# Patient Record
Sex: MALE | Race: WHITE | NOT HISPANIC OR LATINO | Employment: STUDENT | URBAN - METROPOLITAN AREA
[De-identification: names, ages, dates, MRNs, and addresses within clinical notes are randomized per-mention and may not be internally consistent; named-entity substitution may affect disease eponyms.]

---
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
## 2019-05-16 ENCOUNTER — TELEPHONE (OUTPATIENT)
Dept: PEDIATRICS | Facility: CLINIC | Age: 19
End: 2019-05-16

## 2019-05-16 NOTE — TELEPHONE ENCOUNTER
Patient dropped off school form in [place] location. Form faxed to 's office at [phone] to be signed. Had well visit 7/11/2018. Please fax back to [place] office at [phone] for patient to .     Call patient at [phone] when ready for .

## 2019-05-17 ENCOUNTER — TELEPHONE (OUTPATIENT)
Dept: PEDIATRICS | Facility: CLINIC | Age: 19
End: 2019-05-17

## 2019-05-17 NOTE — TELEPHONE ENCOUNTER
I was noted that ankur's school was never received at 's office. Form has been faxed to [phone]. Please fax back to [place] office at [phone] when signed.

## 2019-05-27 ENCOUNTER — TELEPHONE (OUTPATIENT)
Dept: PEDIATRICS | Facility: CLINIC | Age: 19
End: 2019-05-27

## 2019-05-27 NOTE — TELEPHONE ENCOUNTER
I am following up on a college form that was faxed to you guys on 5/17. Looks like you forward  the message on 5/17. Has  signed it yet?

## 2020-01-07 ENCOUNTER — OFFICE VISIT (OUTPATIENT)
Dept: FAMILY MEDICINE | Facility: CLINIC | Age: 20
End: 2020-01-07
Payer: COMMERCIAL

## 2020-01-07 VITALS
HEIGHT: 67 IN | OXYGEN SATURATION: 99 % | SYSTOLIC BLOOD PRESSURE: 118 MMHG | TEMPERATURE: 98 F | HEART RATE: 58 BPM | BODY MASS INDEX: 27.66 KG/M2 | DIASTOLIC BLOOD PRESSURE: 76 MMHG | WEIGHT: 176.25 LBS

## 2020-01-07 DIAGNOSIS — Z00.00 ANNUAL PHYSICAL EXAM: Primary | ICD-10-CM

## 2020-01-07 DIAGNOSIS — R22.2 LUMP OF SKIN OF BACK: ICD-10-CM

## 2020-01-07 DIAGNOSIS — L70.0 ACNE VULGARIS: ICD-10-CM

## 2020-01-07 DIAGNOSIS — Z23 NEED FOR IMMUNIZATION AGAINST INFLUENZA: ICD-10-CM

## 2020-01-07 PROCEDURE — 99999 PR PBB SHADOW E&M-EST. PATIENT-LVL III: ICD-10-PCS | Mod: PBBFAC,,, | Performed by: INTERNAL MEDICINE

## 2020-01-07 PROCEDURE — 90471 IMMUNIZATION ADMIN: CPT | Mod: S$GLB,,, | Performed by: INTERNAL MEDICINE

## 2020-01-07 PROCEDURE — 90686 IIV4 VACC NO PRSV 0.5 ML IM: CPT | Mod: S$GLB,,, | Performed by: INTERNAL MEDICINE

## 2020-01-07 PROCEDURE — 99395 PREV VISIT EST AGE 18-39: CPT | Mod: 25,S$GLB,, | Performed by: INTERNAL MEDICINE

## 2020-01-07 PROCEDURE — 99999 PR PBB SHADOW E&M-EST. PATIENT-LVL III: CPT | Mod: PBBFAC,,, | Performed by: INTERNAL MEDICINE

## 2020-01-07 PROCEDURE — 90686 FLU VACCINE (QUAD) GREATER THAN OR EQUAL TO 3YO PRESERVATIVE FREE IM: ICD-10-PCS | Mod: S$GLB,,, | Performed by: INTERNAL MEDICINE

## 2020-01-07 PROCEDURE — 90471 FLU VACCINE (QUAD) GREATER THAN OR EQUAL TO 3YO PRESERVATIVE FREE IM: ICD-10-PCS | Mod: S$GLB,,, | Performed by: INTERNAL MEDICINE

## 2020-01-07 PROCEDURE — 99395 PR PREVENTIVE VISIT,EST,18-39: ICD-10-PCS | Mod: 25,S$GLB,, | Performed by: INTERNAL MEDICINE

## 2020-01-07 RX ORDER — CLINDAMYCIN PHOSPHATE 10 MG/ML
1 SOLUTION TOPICAL DAILY
COMMUNITY
End: 2020-09-02

## 2020-01-07 RX ORDER — TAZAROTENE 1 MG/G
0.1 CREAM TOPICAL NIGHTLY
COMMUNITY
End: 2020-09-10

## 2020-01-07 RX ORDER — MINOCYCLINE HYDROCHLORIDE 135 MG/1
135 CAPSULE, EXTENDED RELEASE ORAL DAILY
COMMUNITY
End: 2020-09-02

## 2020-01-07 NOTE — ASSESSMENT & PLAN NOTE
Eats healthy, uses calorie tracker, cooks a lot.  Exercises 6 times per week, does 15 mins -30 mins of cardio and then does weight lifting.  Nonsmoker, nondrinker, comes to appt alone.  Sexually active, uses protection.

## 2020-01-07 NOTE — PROGRESS NOTES
[facility] Note    Chief Complaint      Chief Complaint   Patient presents with    [facility]    Annual Exam     History of Present Illness      Benoit Vasquez is a 19 y.o. male who presents today for annual preventative exam.  Patient comes to appointment alone.    Derm- Dr. 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  Originally from [place], graduated from [employer].  Problem List Items Addressed This Visit     Annual physical exam - Primary    Current Assessment & Plan     Eats healthy, uses calorie tracker, cooks a lot.  Exercises 6 times per week, does 15 mins -30 mins of cardio and then does weight lifting.  Nonsmoker, nondrinker, comes to appt alone.  Sexually active, uses protection.         Acne vulgaris    Current Assessment & Plan     Sees Dr. Tovar, doing better on various ointments for last 6 months.         Lump of skin of back    Current Assessment & Plan     Noticed on back of neck last week.           Other Visit Diagnoses     Need for immunization against influenza        Relevant Orders    Influenza - Quadrivalent (PF)          Health Maintenance   Topic Date Due    TETANUS VACCINE  06/01/2021    Lipid Panel  Completed    HPV Vaccines  Completed       Past Medical History:   Diagnosis Date    History of bronchitis        Past Surgical History:   Procedure Laterality Date    CIRCUMCISION      NO PAST SURGERIES         family history is not on file.     Social History     Tobacco Use    Smoking status: Never Smoker    Smokeless tobacco: Never Used   Substance Use Topics    Alcohol use: Yes     Alcohol/week: 1.0 standard drinks     Types: 1 Cans of beer per week    Drug use: Yes     Types: Marijuana       Review of Systems   Constitutional: Negative for chills and fever.   HENT: Negative for congestion and sore throat.    Eyes: Negative for blurred vision and discharge.   Respiratory: Negative for cough and shortness of breath.   "  Cardiovascular: Negative for chest pain and palpitations.   Gastrointestinal: Negative for constipation, diarrhea, nausea and vomiting.   Genitourinary: Negative for dysuria and hematuria.   Musculoskeletal: Negative for falls and myalgias.   Skin: Negative for itching and rash.   Neurological: Negative for dizziness and headaches.        Outpatient Encounter Medications as of 1/7/2020   Medication Sig Dispense Refill    clindamycin (CLEOCIN T) 1 % Swab Apply 1 each topically once daily.      minocycline (XIMINO) 135 mg Cp24 Take 135 mg by mouth once daily.      tazarotene (AVAGE) 0.1 % cream Apply 0.1 % topically every evening.      [DISCONTINUED] sildenafil (REVATIO) 20 mg Tab Take 1 tablet (20 mg total) by mouth As instructed. Take 2-5 tablets as needed. 100 tablet 11     No facility-administered encounter medications on file as of 1/7/2020.        Review of patient's allergies indicates:  No Known Allergies    Physical Exam      Vital Signs  Temp: 98 °F (36.7 °C)  Temp src: Oral  Pulse: (!) 58  SpO2: 99 %  BP: 118/76  BP Location: Left arm  Patient Position: Sitting  Pain Score: 0-No pain  Height and Weight  Height: 5' 7" (170.2 cm)  Weight: 79.9 kg (176 lb 4.1 oz)  BSA (Calculated - sq m): 1.94 sq meters  BMI (Calculated): 27.6  Weight in (lb) to have BMI = 25: 159.3]    Physical Exam   Constitutional: He is oriented to person, place, and time. He appears well-developed and well-nourished.   HENT:   Head: Normocephalic and atraumatic.   Right Ear: External ear normal.   Left Ear: External ear normal.   Eyes: Right eye exhibits no discharge. Left eye exhibits no discharge.   Neck: Normal range of motion. No thyromegaly present.   Cardiovascular: Normal rate, regular rhythm and intact distal pulses.   No murmur heard.  Pulmonary/Chest: Effort normal and breath sounds normal. No respiratory distress.   Abdominal: Soft. Bowel sounds are normal. He exhibits no distension. There is no tenderness. "   Musculoskeletal: Normal range of motion. He exhibits no deformity.   Neurological: He is alert and oriented to person, place, and time.   Skin: Skin is warm and dry. No rash noted.   Psychiatric: He has a normal mood and affect. His behavior is normal.        Laboratory:  CBC:  No results for input(s): WBC, RBC, HGB, HCT, PLT, MCV, MCH, MCHC in the last 2160 hours.  CMP:  No results for input(s): GLU, CALCIUM, ALBUMIN, PROT, NA, K, CO2, CL, BUN, ALKPHOS, ALT, AST, BILITOT in the last 2160 hours.    Invalid input(s): CREATININ  URINALYSIS:  No results for input(s): COLORU, CLARITYU, SPECGRAV, PHUR, PROTEINUA, GLUCOSEU, BILIRUBINCON, BLOODU, WBCU, RBCU, BACTERIA, MUCUS, NITRITE, LEUKOCYTESUR, UROBILINOGEN, HYALINECASTS in the last 2160 hours.   LIPIDS:  No results for input(s): TSH, HDL, CHOL, TRIG, LDLCALC, CHOLHDL, NONHDLCHOL, TOTALCHOLEST in the last 2160 hours.  TSH:  No results for input(s): TSH in the last 2160 hours.  A1C:  No results for input(s): HGBA1C in the last 2160 hours.    Radiology:  No new imaging    Assessment/Plan     Benoit Vasquez is a 19 y.o.male with:    1. Annual physical exam    2. Need for immunization against influenza  - Influenza - Quadrivalent (PF)    3. Acne vulgaris    4. Lump of skin of back    -Lipoma vs sebaceous cyst on back of neck, will f/u with Dr. Tovar  -flu shot today  -plan for full panel of labs next visit  -Continue current medications and maintain follow up with specialists.  Return to clinic in 1 year     Lissa Ocampo MD  [facility]

## 2020-04-13 PROBLEM — Z00.00 ANNUAL PHYSICAL EXAM: Status: RESOLVED | Noted: 2020-01-07 | Resolved: 2020-04-13

## 2020-09-02 ENCOUNTER — OFFICE VISIT (OUTPATIENT)
Dept: FAMILY MEDICINE | Facility: CLINIC | Age: 20
End: 2020-09-02
Payer: COMMERCIAL

## 2020-09-02 VITALS
BODY MASS INDEX: 24.72 KG/M2 | SYSTOLIC BLOOD PRESSURE: 122 MMHG | HEART RATE: 56 BPM | DIASTOLIC BLOOD PRESSURE: 70 MMHG | WEIGHT: 162.56 LBS | OXYGEN SATURATION: 98 % | TEMPERATURE: 98 F

## 2020-09-02 DIAGNOSIS — Z11.59 SCREENING FOR VIRAL DISEASE: ICD-10-CM

## 2020-09-02 DIAGNOSIS — M25.561 CHRONIC PAIN OF BOTH KNEES: ICD-10-CM

## 2020-09-02 DIAGNOSIS — Z11.4 SCREENING FOR HIV (HUMAN IMMUNODEFICIENCY VIRUS): ICD-10-CM

## 2020-09-02 DIAGNOSIS — M25.562 CHRONIC PAIN OF BOTH KNEES: ICD-10-CM

## 2020-09-02 DIAGNOSIS — G47.9 SLEEP DISORDER: ICD-10-CM

## 2020-09-02 DIAGNOSIS — M25.562 CHRONIC PAIN OF LEFT KNEE: ICD-10-CM

## 2020-09-02 DIAGNOSIS — G89.29 CHRONIC PAIN OF LEFT KNEE: ICD-10-CM

## 2020-09-02 DIAGNOSIS — G89.29 CHRONIC PAIN OF BOTH KNEES: ICD-10-CM

## 2020-09-02 DIAGNOSIS — Z00.00 ANNUAL PHYSICAL EXAM: Primary | ICD-10-CM

## 2020-09-02 PROCEDURE — 99999 PR PBB SHADOW E&M-EST. PATIENT-LVL III: ICD-10-PCS | Mod: PBBFAC,,, | Performed by: INTERNAL MEDICINE

## 2020-09-02 PROCEDURE — 99214 PR OFFICE/OUTPT VISIT, EST, LEVL IV, 30-39 MIN: ICD-10-PCS | Mod: S$GLB,,, | Performed by: INTERNAL MEDICINE

## 2020-09-02 PROCEDURE — 3008F PR BODY MASS INDEX (BMI) DOCUMENTED: ICD-10-PCS | Mod: CPTII,S$GLB,, | Performed by: INTERNAL MEDICINE

## 2020-09-02 PROCEDURE — 99214 OFFICE O/P EST MOD 30 MIN: CPT | Mod: S$GLB,,, | Performed by: INTERNAL MEDICINE

## 2020-09-02 PROCEDURE — 99999 PR PBB SHADOW E&M-EST. PATIENT-LVL III: CPT | Mod: PBBFAC,,, | Performed by: INTERNAL MEDICINE

## 2020-09-02 PROCEDURE — 3008F BODY MASS INDEX DOCD: CPT | Mod: CPTII,S$GLB,, | Performed by: INTERNAL MEDICINE

## 2020-09-09 ENCOUNTER — PATIENT OUTREACH (OUTPATIENT)
Dept: ADMINISTRATIVE | Facility: OTHER | Age: 20
End: 2020-09-09

## 2020-09-10 ENCOUNTER — TELEPHONE (OUTPATIENT)
Dept: SLEEP MEDICINE | Facility: CLINIC | Age: 20
End: 2020-09-10

## 2020-09-10 ENCOUNTER — OFFICE VISIT (OUTPATIENT)
Dept: SLEEP MEDICINE | Facility: CLINIC | Age: 20
End: 2020-09-10
Payer: COMMERCIAL

## 2020-09-10 DIAGNOSIS — G47.9 SLEEP DISORDER: ICD-10-CM

## 2020-09-10 DIAGNOSIS — G25.81 RLS (RESTLESS LEGS SYNDROME): ICD-10-CM

## 2020-09-10 DIAGNOSIS — E83.10 DISORDER OF IRON METABOLISM, UNSPECIFIED: Primary | ICD-10-CM

## 2020-09-10 DIAGNOSIS — G47.10 HYPERSOMNIA, UNSPECIFIED: Primary | ICD-10-CM

## 2020-09-10 DIAGNOSIS — G47.10 HYPERSOMNIA, UNSPECIFIED: ICD-10-CM

## 2020-09-10 PROCEDURE — 99203 OFFICE O/P NEW LOW 30 MIN: CPT | Mod: 95,,, | Performed by: NURSE PRACTITIONER

## 2020-09-10 PROCEDURE — 99203 PR OFFICE/OUTPT VISIT, NEW, LEVL III, 30-44 MIN: ICD-10-PCS | Mod: 95,,, | Performed by: NURSE PRACTITIONER

## 2020-09-10 NOTE — PROGRESS NOTES
"The patient location is: home  The chief complaint leading to consultation is sleep evaluation  Visit type: TELE AUDIOVISUAL:66050    Face to Face time with patient: 35 minutes of total time spent on the encounter, which includes face to face time and non-face to face time preparing to see the patient (eg, review of tests), Obtaining and/or reviewing separately obtained history, Documenting clinical information in the electronic or other health record, Independently interpreting results (not separately reported) and communicating results to the patient/family/caregiver, or Care coordination (not separately reported).   Each patient to whom he or she provides medical services by telemedicine is:  (1) informed of the relationship between the physician and patient and the respective role of any other health care provider with respect to management of the patient; and (2) notified that he or she may decline to receive medical services by telemedicine and may withdraw from such care at any time.    Notes: Benoit Vasquez  was seen as a new patient, referred by Dr. Ocampo for the evaluation of sleep disturbance    CHIEF COMPLAINT: disrupted sleep    HISTORY OF PRESENT ILLNESS:He has never had a sleep study. Wakes up middle of the night unable to breathe, ongoing several years. Does not occur every night. Little more fatigued due to disruptions. Not sure if snores. Denies witnessed apneic pauses. Slept undisturbed last night. AM headaches less since reducing caffeine intake (was drinking 3-4 large coffee/d, now about 1)    +sleep paralysis, quite a few times  Shadows in room sees of people upon waking up and feels like he's still in dream at times/lucid  Vivid dreams "for sure lucid dream every night" quickly after sleep onset  Talk in sleep right after falling asleep  Denies excessive sleepiness throughout day  +poor focus/concentration  +fragmented sleep  +muscle weakness in legs when laughing really hard/happened few " times  While playing beach volleyball had uncontrollable urge to doze off, had to go nap    Takes MLT helps stay asleep    +symptoms of restless legs, has to rotate legs/move them when in bed/kicks them for awhile. Happens few times a week    EPWORTH SLEEPINESS SCALE 9/10/2020   Sitting and reading 1   Watching TV 0   Sitting, inactive in a public place (e.g. a theatre or a meeting) 0   As a passenger in a car for an hour without a break 2   Lying down to rest in the afternoon when circumstances permit 1   Sitting and talking to someone 0   Sitting quietly after a lunch without alcohol 0   In a car, while stopped for a few minutes in traffic 0   Total score 4     Sleep Clinic New Patient 9/10/2020   What time do you go to bed on a week day? (Give a range) 10 pm   What time do you go to bed on a day off? (Give a range) 12 AM   How long does it take you to fall asleep? (Give a range) Very long, anywhere from 45 minutes to a couple hours   On average, how many times per night do you wake up? 2-3 times   How long does it take you to fall back into sleep? (Give a range) Twenty minutes to half an hour   What time do you wake up to start your day on a week day? (Give a range) 6 AM to 8 AM   What time do you wake up to start your day on a day off? (Give a range) 9 AM to 11 AM   What time do you get out of bed? (Give a range) 6:15 AM to 8:45 AM   On average, how many hours do you sleep? 6-8   On average, how many naps do you take per day? 0   Rate your sleep quality from 0 to 5 (0-poor, 5-great). 2   Have you experienced:  Weight loss?   How much weight have you lost or gained (in lbs.) in the last year? 15 lbs   On average, how many times per night do you go to the bathroom?  Once or twice   Have you ever had a sleep study/CPAP machine/surgery for sleep apnea? No   Have you ever had a CPAP machine for sleep apnea? No   Have you ever had surgery for sleep apnea? No     FAMILY HISTORY: ? 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  Yes   Sore throat or dry mouth in the morning? Yes   Irregular or very fast heart beat?  No   Shortness of breath?  No   Acid reflux? Yes--with acidic christina/foods   Body aches and pains?  Yes--knee pain/seeing somebody   Morning headaches? Yes   Dizziness? Sometimes   Mood changes?  Yes   Do you exercise?  Yes   Do you feel like moving your legs a lot?  Yes   Otherwise, a balance of 10 systems reviewed is negative    PHYSICAL EXAM:   There were no vitals taken for this visit.  GENERAL: W/D, W/N body habitus, well groomed   HEENT: Conjunctivae are non-erythematous; Pupils equal, round, and reactive to light; Nose is symmetrical  SKIN: On face and neck: No abrasions, no rashes, no lesions  RESPIRATORY: Normal chest expansion and non-labored breathing at rest.    NEURO/PSYCH: Oriented to time, place and person. Normal attention span and concentration. Affect is full. Mood is normal.       ASSESSMENT:   Hypersomnia, unspecified with fragmented sleep, sleep paralysis, vivid dreaming with sleep onset, uncontrollable urges to nap, hypnagogic and hypnopompic hallucinations, cataplexy like episodes, suspicious for central hypersomnolence.   RLS  Iron metabolism disorder, unspecified  Nasal restriction    PLAN:   1. Polysomnogram followed by MSLT (discussed plan of care urine tox, 2 wk sleep log)  2. Check ferritin. Discussed etiology and LILLY as treatable cause of RLS  3. Discussed etiology of CLIVE and potential ramifications of untreated CLIVE, including stroke, heart disease, HTN.  We discussed potential treatment options, which could include weight loss (10-15%),continuous positive airway pressure (CPAP-definitive), mandibular advancement splint by dentist, or referral for surgical consideration.   4. The patient was advised to abstain from driving should he feel sleepy or drowsy.   5.  Consider seeing ENT to improve nasal patency        Thank you for allowing me the opportunity to participate in the care of your patient

## 2020-09-10 NOTE — LETTER
September 10, 2020      Lissa Ocampo MD  [address]  [address]  [place] [address]           [facility]  [address] [place] [address]  Phone: [phone]  Fax: [phone]          Patient: Benoit Vasquez   MR Number: [id number]   YOB: 2000   Date of Visit: 9/10/2020       Dear Dr. Lissa Ocampo:    Thank you for referring Benoit Vasquez to me for evaluation. Attached you will find relevant portions of my assessment and plan of care.    If you have questions, please do not hesitate to call me. I look forward to following Benoit Vasquez along with you.    Sincerely,    Bryanna Morales, NP    Enclosure  CC:  No Recipients    If you would like to receive this communication electronically, please contact [email or web address] or [phone] to request more information on [employer] access.    For providers and/or their staff who would like to refer a patient to [facility], please contact us through our one-stop-shop provider referral line, [facility], at [phone].    If you feel you have received this communication in error or would no longer like to receive these types of communications, please e-mail [email or web address]

## 2020-12-22 ENCOUNTER — PATIENT MESSAGE (OUTPATIENT)
Dept: FAMILY MEDICINE | Facility: CLINIC | Age: 20
End: 2020-12-22

## 2020-12-22 DIAGNOSIS — M25.561 CHRONIC PAIN OF BOTH KNEES: Primary | ICD-10-CM

## 2020-12-22 DIAGNOSIS — G89.29 CHRONIC PAIN OF BOTH KNEES: Primary | ICD-10-CM

## 2020-12-22 DIAGNOSIS — M25.562 CHRONIC PAIN OF BOTH KNEES: Primary | ICD-10-CM

## 2021-01-06 ENCOUNTER — CLINICAL SUPPORT (OUTPATIENT)
Dept: REHABILITATION | Facility: HOSPITAL | Age: 21
End: 2021-01-06
Payer: COMMERCIAL

## 2021-01-06 DIAGNOSIS — Z74.09 IMPAIRED MOBILITY: ICD-10-CM

## 2021-01-06 DIAGNOSIS — M25.561 CHRONIC PAIN OF BOTH KNEES: Primary | ICD-10-CM

## 2021-01-06 DIAGNOSIS — R53.1 DECREASED STRENGTH: ICD-10-CM

## 2021-01-06 DIAGNOSIS — M25.562 CHRONIC PAIN OF BOTH KNEES: Primary | ICD-10-CM

## 2021-01-06 DIAGNOSIS — G89.29 CHRONIC PAIN OF BOTH KNEES: Primary | ICD-10-CM

## 2021-01-06 PROCEDURE — 97110 THERAPEUTIC EXERCISES: CPT | Mod: PN

## 2021-01-06 PROCEDURE — 97161 PT EVAL LOW COMPLEX 20 MIN: CPT | Mod: PN

## 2021-01-11 PROBLEM — R53.1 DECREASED STRENGTH: Status: ACTIVE | Noted: 2021-01-11

## 2021-01-11 PROBLEM — Z74.09 IMPAIRED MOBILITY: Status: ACTIVE | Noted: 2021-01-11

## 2021-01-11 PROBLEM — G89.29 CHRONIC PAIN OF BOTH KNEES: Status: ACTIVE | Noted: 2021-01-11

## 2021-01-11 PROBLEM — M25.562 CHRONIC PAIN OF BOTH KNEES: Status: ACTIVE | Noted: 2021-01-11

## 2021-01-11 PROBLEM — M25.561 CHRONIC PAIN OF BOTH KNEES: Status: ACTIVE | Noted: 2021-01-11

## 2021-02-15 ENCOUNTER — DOCUMENTATION ONLY (OUTPATIENT)
Dept: REHABILITATION | Facility: HOSPITAL | Age: 21
End: 2021-02-15

## 2021-02-15 ENCOUNTER — TELEPHONE (OUTPATIENT)
Dept: REHABILITATION | Facility: HOSPITAL | Age: 21
End: 2021-02-15

## 2021-02-25 ENCOUNTER — DOCUMENTATION ONLY (OUTPATIENT)
Dept: REHABILITATION | Facility: HOSPITAL | Age: 21
End: 2021-02-25

## 2021-02-25 PROBLEM — Z74.09 IMPAIRED MOBILITY: Status: RESOLVED | Noted: 2021-01-11 | Resolved: 2021-02-25

## 2021-02-25 PROBLEM — M25.562 CHRONIC PAIN OF BOTH KNEES: Status: RESOLVED | Noted: 2021-01-11 | Resolved: 2021-02-25

## 2021-02-25 PROBLEM — R53.1 DECREASED STRENGTH: Status: RESOLVED | Noted: 2021-01-11 | Resolved: 2021-02-25

## 2021-02-25 PROBLEM — M25.561 CHRONIC PAIN OF BOTH KNEES: Status: RESOLVED | Noted: 2021-01-11 | Resolved: 2021-02-25

## 2021-02-25 PROBLEM — G89.29 CHRONIC PAIN OF BOTH KNEES: Status: RESOLVED | Noted: 2021-01-11 | Resolved: 2021-02-25

## 2021-04-13 ENCOUNTER — IMMUNIZATION (OUTPATIENT)
Dept: PRIMARY CARE CLINIC | Facility: CLINIC | Age: 21
End: 2021-04-13
Payer: COMMERCIAL

## 2021-04-13 DIAGNOSIS — Z23 NEED FOR VACCINATION: Primary | ICD-10-CM

## 2021-04-13 PROCEDURE — 0001A COVID-19, MRNA, LNP-S, PF, 30 MCG/0.3 ML DOSE VACCINE: CPT | Mod: CV19,S$GLB,, | Performed by: INTERNAL MEDICINE

## 2021-04-13 PROCEDURE — 91300 COVID-19, MRNA, LNP-S, PF, 30 MCG/0.3 ML DOSE VACCINE: ICD-10-PCS | Mod: S$GLB,,, | Performed by: INTERNAL MEDICINE

## 2021-04-13 PROCEDURE — 91300 COVID-19, MRNA, LNP-S, PF, 30 MCG/0.3 ML DOSE VACCINE: CPT | Mod: S$GLB,,, | Performed by: INTERNAL MEDICINE

## 2021-04-13 PROCEDURE — 0001A COVID-19, MRNA, LNP-S, PF, 30 MCG/0.3 ML DOSE VACCINE: ICD-10-PCS | Mod: CV19,S$GLB,, | Performed by: INTERNAL MEDICINE

## 2021-05-04 ENCOUNTER — IMMUNIZATION (OUTPATIENT)
Dept: PRIMARY CARE CLINIC | Facility: CLINIC | Age: 21
End: 2021-05-04
Payer: COMMERCIAL

## 2021-05-04 DIAGNOSIS — Z23 NEED FOR VACCINATION: Primary | ICD-10-CM

## 2021-05-04 PROCEDURE — 91300 COVID-19, MRNA, LNP-S, PF, 30 MCG/0.3 ML DOSE VACCINE: ICD-10-PCS | Mod: S$GLB,,, | Performed by: INTERNAL MEDICINE

## 2021-05-04 PROCEDURE — 0002A COVID-19, MRNA, LNP-S, PF, 30 MCG/0.3 ML DOSE VACCINE: ICD-10-PCS | Mod: CV19,S$GLB,, | Performed by: INTERNAL MEDICINE

## 2021-05-04 PROCEDURE — 0002A COVID-19, MRNA, LNP-S, PF, 30 MCG/0.3 ML DOSE VACCINE: CPT | Mod: CV19,S$GLB,, | Performed by: INTERNAL MEDICINE

## 2021-05-04 PROCEDURE — 91300 COVID-19, MRNA, LNP-S, PF, 30 MCG/0.3 ML DOSE VACCINE: CPT | Mod: S$GLB,,, | Performed by: INTERNAL MEDICINE

## 2021-07-13 ENCOUNTER — PATIENT MESSAGE (OUTPATIENT)
Dept: FAMILY MEDICINE | Facility: CLINIC | Age: 21
End: 2021-07-13

## 2021-08-06 ENCOUNTER — PATIENT MESSAGE (OUTPATIENT)
Dept: FAMILY MEDICINE | Facility: CLINIC | Age: 21
End: 2021-08-06

## 2021-08-16 ENCOUNTER — PATIENT MESSAGE (OUTPATIENT)
Dept: FAMILY MEDICINE | Facility: CLINIC | Age: 21
End: 2021-08-16

## 2021-08-16 ENCOUNTER — TELEPHONE (OUTPATIENT)
Dept: FAMILY MEDICINE | Facility: CLINIC | Age: 21
End: 2021-08-16

## 2021-08-23 ENCOUNTER — CLINICAL SUPPORT (OUTPATIENT)
Dept: FAMILY MEDICINE | Facility: CLINIC | Age: 21
End: 2021-08-23
Payer: COMMERCIAL

## 2021-08-23 DIAGNOSIS — Z23 NEED FOR TDAP VACCINATION: Primary | ICD-10-CM

## 2021-08-23 PROCEDURE — 90715 TDAP VACCINE 7 YRS/> IM: CPT | Mod: S$GLB,,, | Performed by: INTERNAL MEDICINE

## 2021-08-23 PROCEDURE — 90471 IMMUNIZATION ADMIN: CPT | Mod: S$GLB,,, | Performed by: INTERNAL MEDICINE

## 2021-08-23 PROCEDURE — 90471 TDAP VACCINE GREATER THAN OR EQUAL TO 7YO IM: ICD-10-PCS | Mod: S$GLB,,, | Performed by: INTERNAL MEDICINE

## 2021-08-23 PROCEDURE — 90715 TDAP VACCINE GREATER THAN OR EQUAL TO 7YO IM: ICD-10-PCS | Mod: S$GLB,,, | Performed by: INTERNAL MEDICINE

## 2021-10-05 ENCOUNTER — PATIENT MESSAGE (OUTPATIENT)
Dept: ADMINISTRATIVE | Facility: HOSPITAL | Age: 21
End: 2021-10-05

## 2021-10-19 ENCOUNTER — PATIENT MESSAGE (OUTPATIENT)
Dept: FAMILY MEDICINE | Facility: CLINIC | Age: 21
End: 2021-10-19

## 2021-10-28 ENCOUNTER — CLINICAL SUPPORT (OUTPATIENT)
Dept: URGENT CARE | Facility: CLINIC | Age: 21
End: 2021-10-28
Payer: COMMERCIAL

## 2021-10-28 DIAGNOSIS — Z20.822 ENCOUNTER FOR LABORATORY TESTING FOR COVID-19 VIRUS: Primary | ICD-10-CM

## 2021-10-28 LAB
CTP QC/QA: YES
SARS-COV-2 RDRP RESP QL NAA+PROBE: NEGATIVE

## 2021-10-28 PROCEDURE — U0002 COVID-19 LAB TEST NON-CDC: HCPCS | Mod: QW,S$GLB,, | Performed by: PHYSICIAN ASSISTANT

## 2021-10-28 PROCEDURE — U0002: ICD-10-PCS | Mod: QW,S$GLB,, | Performed by: PHYSICIAN ASSISTANT

## 2021-10-31 ENCOUNTER — PATIENT MESSAGE (OUTPATIENT)
Dept: FAMILY MEDICINE | Facility: CLINIC | Age: 21
End: 2021-10-31
Payer: COMMERCIAL

## 2021-11-01 ENCOUNTER — PATIENT MESSAGE (OUTPATIENT)
Dept: FAMILY MEDICINE | Facility: CLINIC | Age: 21
End: 2021-11-01
Payer: COMMERCIAL

## 2021-12-23 ENCOUNTER — PATIENT MESSAGE (OUTPATIENT)
Dept: FAMILY MEDICINE | Facility: CLINIC | Age: 21
End: 2021-12-23
Payer: COMMERCIAL

## 2022-01-10 ENCOUNTER — PATIENT MESSAGE (OUTPATIENT)
Dept: ADMINISTRATIVE | Facility: HOSPITAL | Age: 22
End: 2022-01-10
Payer: COMMERCIAL

## 2022-02-07 ENCOUNTER — OFFICE VISIT (OUTPATIENT)
Dept: DERMATOLOGY | Facility: CLINIC | Age: 22
End: 2022-02-07
Payer: COMMERCIAL

## 2022-02-07 DIAGNOSIS — Z79.899 HIGH RISK MEDICATION USE: ICD-10-CM

## 2022-02-07 DIAGNOSIS — L70.0 ACNE VULGARIS: Primary | ICD-10-CM

## 2022-02-07 PROCEDURE — 1160F RVW MEDS BY RX/DR IN RCRD: CPT | Mod: CPTII,95,, | Performed by: DERMATOLOGY

## 2022-02-07 PROCEDURE — 1159F PR MEDICATION LIST DOCUMENTED IN MEDICAL RECORD: ICD-10-PCS | Mod: CPTII,95,, | Performed by: DERMATOLOGY

## 2022-02-07 PROCEDURE — 99203 OFFICE O/P NEW LOW 30 MIN: CPT | Mod: 95,,, | Performed by: DERMATOLOGY

## 2022-02-07 PROCEDURE — 1159F MED LIST DOCD IN RCRD: CPT | Mod: CPTII,95,, | Performed by: DERMATOLOGY

## 2022-02-07 PROCEDURE — 1160F PR REVIEW ALL MEDS BY PRESCRIBER/CLIN PHARMACIST DOCUMENTED: ICD-10-PCS | Mod: CPTII,95,, | Performed by: DERMATOLOGY

## 2022-02-07 PROCEDURE — 99203 PR OFFICE/OUTPT VISIT, NEW, LEVL III, 30-44 MIN: ICD-10-PCS | Mod: 95,,, | Performed by: DERMATOLOGY

## 2022-02-07 NOTE — PROGRESS NOTES
The patient location is: [facility]  The chief complaint leading to consultation is: acne    Visit type: audiovisual    Face to Face time with patient: 6m    10 minutes of total time spent on the encounter, which includes face to face time and non-face to face time preparing to see the patient (eg, review of tests), Obtaining and/or reviewing separately obtained history, Documenting clinical information in the electronic or other health record, Independently interpreting results (not separately reported) and communicating results to the patient/family/caregiver, or Care coordination (not separately reported).         Each patient to whom he or she provides medical services by telemedicine is:  (1) informed of the relationship between the physician and patient and the respective role of any other health care provider with respect to management of the patient; and (2) notified that he or she may decline to receive medical services by telemedicine and may withdraw from such care at any time.    Notes:     Subjective:       Patient ID:  Benoit Vasquez is a 21 y.o. male who presents for No chief complaint on file.    Pt with acne of the face.  No tx now.  Was on accutane 6 yrs ago with good results.  Wants to go back on it.      Review of Systems   Constitutional: Negative for fever.   HENT: Negative for sore throat.    Respiratory: Negative for cough.         Objective:    Physical Exam   Constitutional: He appears well-developed and well-nourished.   Eyes: No conjunctival no injection.   Neurological: He is alert and oriented to person, place, and time.   Psychiatric: He has a normal mood and affect.   Skin:                 Diagram Legend     Erythematous scaling macule/papule c/w actinic keratosis       Vascular papule c/w angioma      Pigmented verrucoid papule/plaque c/w seborrheic keratosis      Yellow umbilicated papule c/w sebaceous hyperplasia      Irregularly shaped tan macule c/w lentigo     1-2 mm smooth white  papules consistent with Milia      Movable subcutaneous cyst with punctum c/w epidermal inclusion cyst      Subcutaneous movable cyst c/w pilar cyst      Firm pink to brown papule c/w dermatofibroma      Pedunculated fleshy papule(s) c/w skin tag(s)      Evenly pigmented macule c/w junctional nevus     Mildly variegated pigmented, slightly irregular-bordered macule c/w mildly atypical nevus      Flesh colored to evenly pigmented papule c/w intradermal nevus       Pink pearly papule/plaque c/w basal cell carcinoma      Erythematous hyperkeratotic cursted plaque c/w SCC      Surgical scar with no sign of skin cancer recurrence      Open and closed comedones      Inflammatory papules and pustules      Verrucoid papule consistent consistent with wart     Erythematous eczematous patches and plaques     Dystrophic onycholytic nail with subungual debris c/w onychomycosis     Umbilicated papule    Erythematous-base heme-crusted tan verrucoid plaque consistent with inflamed seborrheic keratosis     Erythematous Silvery Scaling Plaque c/w Psoriasis     See annotation              Assessment / Plan:        Acne vulgaris  Reviewed with patient today acne treatment options of topicals, oral antibiotics, aldactone (if applicable), accutane, cosmetic PDT.  Pt wants accutane.  Discussed risks and benefits of Isotretinoin including but not limited to dry eyes, dry skin, and dry lips; headaches; nosebleeds; muscle aches; joint aches; elevated liver functions; elevated cholesterol or triglycerides; depression; diarrhea/stomach cramping (inflammatory bowel disease); increased sun sensitivity. No laser while on Isotretinoin or for one month after completion of Isotretinoin course. No waxing and no donating blood while on Isotretinoin or for one month after completion of Isotretinoin course. No sharing the Isotretinoin with other people.     Will get consents signed and enter patient into [employer] program.  Will check baseline lipid  panel, liver function tests.   If labs normal, will start Isotretinoin at 40 mg po daily.   Brochures reviewed and provided.  To be done at in person appt soon.  No etoh rev'd.      High risk medication use  -     Lipid Panel; Future; Expected date: 02/07/2022  -     Hepatic Function Panel; Future; Expected date: 02/07/2022  Discussed with patient the need for laboratory work up for further evaluation.  Discussed that such investigation may not be helpful.             Follow up in about 1 week (around 2/14/2022).

## 2022-02-22 ENCOUNTER — LAB VISIT (OUTPATIENT)
Dept: LAB | Facility: HOSPITAL | Age: 22
End: 2022-02-22
Attending: DERMATOLOGY
Payer: COMMERCIAL

## 2022-02-22 DIAGNOSIS — Z79.899 HIGH RISK MEDICATION USE: ICD-10-CM

## 2022-02-22 LAB
ALBUMIN SERPL BCP-MCNC: 4.5 G/DL (ref 3.5–5.2)
ALP SERPL-CCNC: 64 U/L (ref 55–135)
ALT SERPL W/O P-5'-P-CCNC: 34 U/L (ref 10–44)
AST SERPL-CCNC: 29 U/L (ref 10–40)
BILIRUB DIRECT SERPL-MCNC: 0.2 MG/DL (ref 0.1–0.3)
BILIRUB SERPL-MCNC: 0.5 MG/DL (ref 0.1–1)
CHOLEST SERPL-MCNC: 160 MG/DL (ref 120–199)
CHOLEST/HDLC SERPL: 3.7 {RATIO} (ref 2–5)
HDLC SERPL-MCNC: 43 MG/DL (ref 40–75)
HDLC SERPL: 26.9 % (ref 20–50)
LDLC SERPL CALC-MCNC: 103.2 MG/DL (ref 63–159)
NONHDLC SERPL-MCNC: 117 MG/DL
PROT SERPL-MCNC: 6.9 G/DL (ref 6–8.4)
TRIGL SERPL-MCNC: 69 MG/DL (ref 30–150)

## 2022-02-22 PROCEDURE — 80061 LIPID PANEL: CPT | Performed by: DERMATOLOGY

## 2022-02-22 PROCEDURE — 80076 HEPATIC FUNCTION PANEL: CPT | Performed by: DERMATOLOGY

## 2022-02-22 PROCEDURE — 36415 COLL VENOUS BLD VENIPUNCTURE: CPT | Mod: PN | Performed by: DERMATOLOGY

## 2022-02-24 ENCOUNTER — TELEPHONE (OUTPATIENT)
Dept: DERMATOLOGY | Facility: CLINIC | Age: 22
End: 2022-02-24
Payer: COMMERCIAL

## 2022-02-24 NOTE — TELEPHONE ENCOUNTER
Called pt to schedule appt. Went straight to VM. Unsuccessful leaving vm due to no vm set up.    JOVI

## 2022-11-21 ENCOUNTER — PATIENT MESSAGE (OUTPATIENT)
Dept: INTERNAL MEDICINE | Facility: CLINIC | Age: 22
End: 2022-11-21

## 2022-11-21 ENCOUNTER — OFFICE VISIT (OUTPATIENT)
Dept: INTERNAL MEDICINE | Facility: CLINIC | Age: 22
End: 2022-11-21
Payer: COMMERCIAL

## 2022-11-21 DIAGNOSIS — F90.1 ATTENTION DEFICIT HYPERACTIVITY DISORDER (ADHD), PREDOMINANTLY HYPERACTIVE TYPE: Primary | ICD-10-CM

## 2022-11-21 PROCEDURE — 99213 PR OFFICE/OUTPT VISIT, EST, LEVL III, 20-29 MIN: ICD-10-PCS | Mod: 95,,, | Performed by: NURSE PRACTITIONER

## 2022-11-21 PROCEDURE — 99213 OFFICE O/P EST LOW 20 MIN: CPT | Mod: 95,,, | Performed by: NURSE PRACTITIONER

## 2022-11-21 NOTE — PROGRESS NOTES
Subjective:    The patient location is: [place]   The chief complaint leading to consultation is: med questions    Visit type: audiovisual    Face to Face time with patient: 15  30 minutes of total time spent on the encounter, which includes face to face time and non-face to face time preparing to see the patient (eg, review of tests), Obtaining and/or reviewing separately obtained history, Documenting clinical information in the electronic or other health record, Independently interpreting results (not separately reported) and communicating results to the patient/family/caregiver, or Care coordination (not separately reported).         Each patient to whom he or she provides medical services by telemedicine is:  (1) informed of the relationship between the physician and patient and the respective role of any other health care provider with respect to management of the patient; and (2) notified that he or she may decline to receive medical services by telemedicine and may withdraw from such care at any time.    Notes:      Patient ID: Benoit Vasquez is a 22 y.o. male.    Chief Complaint: No chief complaint on file.    HPI    This is a 21 y/o male patient of Dr. Ocampo's who presents with question about ADHD meds. Pt reports that he has been getting treatment from a tele med outside of [facility]. Did take adderall as a child. Pt reports since the [place] wide shortage of adderall, he is requesting switching to another med.   Discussed getting papwork, testing and diagnosis from outside tele med so we can have in his chart.   Discussed with Dr. Ocampo and she is ok with managing the change in med. Sent pt a message.       Review of Systems   Respiratory:  Negative for chest tightness and shortness of breath.    Cardiovascular:  Negative for chest pain.   Neurological:  Negative for dizziness and headaches.   Psychiatric/Behavioral:  Positive for decreased concentration. Negative for agitation, behavioral problems and suicidal  ideas. The patient is hyperactive.        Objective:      Physical Exam  Constitutional:       General: He is not in acute distress.  HENT:      Head: Normocephalic and atraumatic.   Cardiovascular:      Comments: No s/s of distress noted  Pulmonary:      Effort: Pulmonary effort is normal. No respiratory distress.   Neurological:      Mental Status: He is alert and oriented to person, place, and time.   Psychiatric:         Mood and Affect: Mood normal.         Behavior: Behavior normal.         Thought Content: Thought content normal.         Judgment: Judgment normal.         Assessment:       Problem List Items Addressed This Visit    None  Visit Diagnoses       Attention deficit hyperactivity disorder (ADHD), predominantly hyperactive type    -  Primary            Plan:       Pt will get records, testing and diagnosis for ADHD so we can have in his chart  He will make a virtual appt with Dr. Ocampo thereafter.   F/u as needed

## 2022-12-12 PROBLEM — F90.9 ADHD: Status: ACTIVE | Noted: 2022-12-12

## 2022-12-27 NOTE — PROGRESS NOTES
Last [facility] NP 5/17/18 noted history of failed colonoscopy. CT colonography 12/2017 negative. Pt due for 5 year repeat.     Order needs to be placed by MD for insurance coverage. Please review.    [facility] Note    Chief Complaint      Chief Complaint   Patient presents with    Knee Pain     c/o chronic pain in L knee for years    Sleep Apnea     c/o possible sleep apnea, c/o waking up not being able to breathe     History of Present Illness      Benoit Vasquez is a 20 y.o. male who presents today for knee pain, sleep issues.  Patient comes to appointment alone.  Derm- Dr. Tovar    Has had intermittent left knee pain for last 6 years, has gotten worse over last 2 years, progressively worse.  Ran CC in HS.  Now can only jump rope for 5 minutes without having to quit.  Now having some right knee pain as well.  Gets very sensitive on patella, lateral side of knee.  Difficult to bear weight when has pain. No numbness/tingling. Knee rarely rachel. Doesn't recall specific injury to knee. Takes ibuprofen on occasion which helps some.    Wakes up in the middle of the night unable to breathe, multiple times per week.  Has been told he snores. Never feels well rested.  Has trouble falling asleep and staying asleep.  Takes melatonin which helps but has to take 10 mg.  No caffeine or alcohol prior to bed.  Some difficulty breathing through nose at baseline.  Has tried breathe rite strips which didn't help.     Problem List Items Addressed This Visit     None      Visit Diagnoses     Annual physical exam    -  Primary    Relevant Orders    CBC auto differential    Lipid Panel    Comprehensive metabolic panel    TSH    Screening for HIV (human immunodeficiency virus)        Relevant Orders    HIV 1/2 Ag/Ab (4th Gen)    Screening for viral disease        Relevant Orders    Hepatitis C Antibody    Chronic pain of left knee        Relevant Orders    X-Ray Knee 3 View Right    Sleep disorder        Relevant Orders    Ambulatory referral/consult to Sleep Disorders          Health Maintenance   Topic Date Due    Hepatitis C Screening  1999    TETANUS VACCINE  06/01/2021    Lipid Panel   Completed    HPV Vaccines  Completed       Past Medical History:   Diagnosis Date    History of bronchitis        Past Surgical History:   Procedure Laterality Date    CIRCUMCISION      NO PAST SURGERIES         family history is not on file.     Social History     Tobacco Use    Smoking status: Never Smoker    Smokeless tobacco: Never Used   Substance Use Topics    Alcohol use: Yes     Alcohol/week: 1.0 standard drinks     Types: 1 Cans of beer per week    Drug use: Yes     Types: Marijuana       Review of Systems   Constitutional: Negative for chills and fever.   HENT: Negative for congestion and sore throat.    Eyes: Negative for blurred vision and discharge.   Respiratory: Negative for cough and shortness of breath.    Cardiovascular: Negative for chest pain and palpitations.   Gastrointestinal: Negative for constipation, diarrhea, nausea and vomiting.   Genitourinary: Negative for dysuria and hematuria.   Musculoskeletal: Negative for falls and myalgias.   Skin: Negative for itching and rash.   Neurological: Negative for dizziness and headaches.        Outpatient Encounter Medications as of 9/2/2020   Medication Sig Dispense Refill    tazarotene (AVAGE) 0.1 % cream Apply 0.1 % topically every evening.      [DISCONTINUED] clindamycin (CLEOCIN T) 1 % Swab Apply 1 each topically once daily.      [DISCONTINUED] minocycline (XIMINO) 135 mg Cp24 Take 135 mg by mouth once daily.       No facility-administered encounter medications on file as of 9/2/2020.        Review of patient's allergies indicates:  No Known Allergies    Physical Exam      Vital Signs  Temp: 98.2 °F (36.8 °C)  Temp src: Oral  Pulse: (!) 56  SpO2: 98 %  BP: 122/70  BP Location: Left arm  Patient Position: Sitting  Pain Score:   1  Pain Loc: Knee  Height and Weight  Weight: 73.8 kg (162 lb 9.4 oz)]    Physical Exam  Constitutional:       Appearance: He is well-developed.   HENT:      Head: Normocephalic and atraumatic.      Right Ear:  External ear normal.      Left Ear: External ear normal.   Eyes:      General:         Right eye: No discharge.         Left eye: No discharge.   Neck:      Musculoskeletal: Normal range of motion.      Thyroid: No thyromegaly.   Cardiovascular:      Rate and Rhythm: Normal rate and regular rhythm.      Heart sounds: No murmur.   Pulmonary:      Effort: Pulmonary effort is normal. No respiratory distress.      Breath sounds: Normal breath sounds.   Abdominal:      General: Bowel sounds are normal. There is no distension.      Palpations: Abdomen is soft.      Tenderness: There is no abdominal tenderness.   Musculoskeletal: Normal range of motion.         General: No deformity.   Skin:     General: Skin is warm and dry.      Findings: No rash.   Neurological:      Mental Status: He is alert and oriented to person, place, and time.   Psychiatric:         Behavior: Behavior normal.          Laboratory:  CBC:  No results for input(s): WBC, RBC, HGB, HCT, PLT, MCV, MCH, MCHC in the last 2160 hours.  CMP:  No results for input(s): GLU, CALCIUM, ALBUMIN, PROT, NA, K, CO2, CL, BUN, ALKPHOS, ALT, AST, BILITOT in the last 2160 hours.    Invalid input(s): CREATININ  URINALYSIS:  Recent Labs   Lab Result Units 08/22/20  1245   Color, UA  Yellow   Specific Gravity, UA  1.020   pH, UA  >8.0*   Protein, UA  Negative   Nitrite, UA  Negative   Leukocytes, UA  Negative   Urobilinogen, UA EU/dL Negative      LIPIDS:  No results for input(s): TSH, HDL, CHOL, TRIG, LDLCALC, CHOLHDL, NONHDLCHOL, TOTALCHOLEST in the last 2160 hours.  TSH:  No results for input(s): TSH in the last 2160 hours.  A1C:  No results for input(s): HGBA1C in the last 2160 hours.    Radiology:  No new imaging    Assessment/Plan     Benoit Vasquez is a 20 y.o.male with:    1. Annual physical exam  - CBC auto differential; Future  - Lipid Panel; Future  - Comprehensive metabolic panel; Future  - TSH; Future    2. Screening for HIV (human immunodeficiency virus)  - HIV  1/2 Ag/Ab (4th Gen); Future    3. Screening for viral disease  - Hepatitis C Antibody; Future    4. Chronic pain of left knee  - X-Ray Knee 3 View Right; Future    5. Sleep disorder  - Ambulatory referral/consult to Sleep Disorders; Future    -labs and xrays today, pending xrays, consider ortho referral and MRI  -refer to sleep clinic for sleep study eval  -counseled on getting flu shot in Fall  -Continue current medications and maintain follow up with specialists.  Return to clinic in 1/2021 for annual visit     Lissa Ocampo MD  [facility]

## 2023-01-10 ENCOUNTER — OFFICE VISIT (OUTPATIENT)
Dept: URGENT CARE | Facility: CLINIC | Age: 23
End: 2023-01-10
Payer: COMMERCIAL

## 2023-01-10 VITALS
HEART RATE: 80 BPM | OXYGEN SATURATION: 99 % | DIASTOLIC BLOOD PRESSURE: 82 MMHG | WEIGHT: 165 LBS | RESPIRATION RATE: 12 BRPM | SYSTOLIC BLOOD PRESSURE: 138 MMHG | BODY MASS INDEX: 25.09 KG/M2 | TEMPERATURE: 98 F

## 2023-01-10 DIAGNOSIS — R07.89 CHEST TIGHTNESS: ICD-10-CM

## 2023-01-10 DIAGNOSIS — U07.1 COVID-19 VIRUS INFECTION: Primary | ICD-10-CM

## 2023-01-10 DIAGNOSIS — R09.81 COUGH WITH CONGESTION OF PARANASAL SINUS: ICD-10-CM

## 2023-01-10 DIAGNOSIS — R06.02 SOB (SHORTNESS OF BREATH): ICD-10-CM

## 2023-01-10 DIAGNOSIS — R05.8 COUGH WITH CONGESTION OF PARANASAL SINUS: ICD-10-CM

## 2023-01-10 DIAGNOSIS — R52 GENERALIZED BODY ACHES: ICD-10-CM

## 2023-01-10 LAB
CTP QC/QA: YES
SARS-COV-2 AG RESP QL IA.RAPID: POSITIVE

## 2023-01-10 PROCEDURE — 93010 EKG 12-LEAD: ICD-10-PCS | Mod: S$GLB,,, | Performed by: INTERNAL MEDICINE

## 2023-01-10 PROCEDURE — 93005 ELECTROCARDIOGRAM TRACING: CPT | Mod: S$GLB,,, | Performed by: PHYSICIAN ASSISTANT

## 2023-01-10 PROCEDURE — 87811 SARS CORONAVIRUS 2 ANTIGEN POCT, MANUAL READ: ICD-10-PCS | Mod: QW,S$GLB,, | Performed by: PHYSICIAN ASSISTANT

## 2023-01-10 PROCEDURE — 3079F PR MOST RECENT DIASTOLIC BLOOD PRESSURE 80-89 MM HG: ICD-10-PCS | Mod: CPTII,S$GLB,, | Performed by: PHYSICIAN ASSISTANT

## 2023-01-10 PROCEDURE — 1159F PR MEDICATION LIST DOCUMENTED IN MEDICAL RECORD: ICD-10-PCS | Mod: CPTII,S$GLB,, | Performed by: PHYSICIAN ASSISTANT

## 2023-01-10 PROCEDURE — 99214 PR OFFICE/OUTPT VISIT, EST, LEVL IV, 30-39 MIN: ICD-10-PCS | Mod: S$GLB,,, | Performed by: PHYSICIAN ASSISTANT

## 2023-01-10 PROCEDURE — 3008F PR BODY MASS INDEX (BMI) DOCUMENTED: ICD-10-PCS | Mod: CPTII,S$GLB,, | Performed by: PHYSICIAN ASSISTANT

## 2023-01-10 PROCEDURE — 93010 ELECTROCARDIOGRAM REPORT: CPT | Mod: S$GLB,,, | Performed by: INTERNAL MEDICINE

## 2023-01-10 PROCEDURE — 3079F DIAST BP 80-89 MM HG: CPT | Mod: CPTII,S$GLB,, | Performed by: PHYSICIAN ASSISTANT

## 2023-01-10 PROCEDURE — 99214 OFFICE O/P EST MOD 30 MIN: CPT | Mod: S$GLB,,, | Performed by: PHYSICIAN ASSISTANT

## 2023-01-10 PROCEDURE — 3008F BODY MASS INDEX DOCD: CPT | Mod: CPTII,S$GLB,, | Performed by: PHYSICIAN ASSISTANT

## 2023-01-10 PROCEDURE — 3075F PR MOST RECENT SYSTOLIC BLOOD PRESS GE 130-139MM HG: ICD-10-PCS | Mod: CPTII,S$GLB,, | Performed by: PHYSICIAN ASSISTANT

## 2023-01-10 PROCEDURE — 87811 SARS-COV-2 COVID19 W/OPTIC: CPT | Mod: QW,S$GLB,, | Performed by: PHYSICIAN ASSISTANT

## 2023-01-10 PROCEDURE — 93005 EKG 12-LEAD: ICD-10-PCS | Mod: S$GLB,,, | Performed by: PHYSICIAN ASSISTANT

## 2023-01-10 PROCEDURE — 1159F MED LIST DOCD IN RCRD: CPT | Mod: CPTII,S$GLB,, | Performed by: PHYSICIAN ASSISTANT

## 2023-01-10 PROCEDURE — 3075F SYST BP GE 130 - 139MM HG: CPT | Mod: CPTII,S$GLB,, | Performed by: PHYSICIAN ASSISTANT

## 2023-01-10 RX ORDER — ALBUTEROL SULFATE 90 UG/1
2 AEROSOL, METERED RESPIRATORY (INHALATION) EVERY 6 HOURS PRN
Qty: 18 G | Refills: 0 | Status: SHIPPED | OUTPATIENT
Start: 2023-01-10 | End: 2024-01-10

## 2023-01-10 RX ORDER — BENZONATATE 200 MG/1
200 CAPSULE ORAL 3 TIMES DAILY PRN
Qty: 30 CAPSULE | Refills: 0 | Status: SHIPPED | OUTPATIENT
Start: 2023-01-10 | End: 2023-01-20

## 2023-01-10 NOTE — PROGRESS NOTES
Subjective:       Patient ID: Benoit Vasquez is a 22 y.o. male.    Vitals:  weight is 74.8 kg (165 lb). His oral temperature is 98.3 °F (36.8 °C). His blood pressure is 138/82 and his pulse is 80. His respiration is 12 and oxygen saturation is 99%.     Chief Complaint: Cough    22-year-old male who is already vaccinated for COVID-19 has a history of ADHD, restless leg, acne vulgaris, hypersomnia, other comorbidities who presents urgent care clinic for evaluation.  Complaining of mildly productive cough yesterday evening, monitor sore throat, generalized body ache and fatigue 7:00 p.m..  Did not take any medication for symptoms.  Today developed wheezing, chest pressure/tightness, and intermittent shortness of breath.  No other associated symptoms.  No sick exposure.  No previous COVID-19 infection.      Medical assistant note:  This is a 22 y.o. male who presents today with a chief complaint of wheezing, SOB, chest pressure, minor cough (phlegm) since last night that has dissipated since noon, minor sore throat, generalized body aches since last night. No  fever. Pt says he began coughing around 7 pm last night, but woke in the middle of the night with severe coughing fit. Home tx: none    Cough  The cough is Productive of sputum. Associated symptoms include chest pain, headaches, heartburn, myalgias, postnasal drip, rhinorrhea, a sore throat, shortness of breath and wheezing. Pertinent negatives include no chills, ear congestion, ear pain, eye redness, fever, nasal congestion, rash or sweats. He has tried nothing for the symptoms. His past medical history is significant for environmental allergies. There is no history of asthma.   Constitution: Negative for activity change, chills, sweating, fatigue, fever and generalized weakness.   HENT:  Positive for congestion, postnasal drip and sore throat. Negative for ear pain, hearing loss, facial swelling, sinus pain, sinus pressure, trouble swallowing and voice change.     Neck: Negative for neck pain, neck stiffness and painful lymph nodes.   Cardiovascular:  Positive for chest pain. Negative for leg swelling, palpitations, sob on exertion and passing out.   Eyes:  Negative for eye discharge, eye pain, eye redness, photophobia, vision loss, double vision, blurred vision and eyelid swelling.   Respiratory:  Positive for cough, sputum production, shortness of breath and wheezing. Negative for chest tightness, COPD and asthma.    Gastrointestinal:  Positive for heartburn. Negative for abdominal pain, nausea, vomiting, diarrhea, bright red blood in stool, dark colored stools, rectal bleeding and bowel incontinence.   Genitourinary:  Negative for dysuria, frequency, urgency, urine decreased, flank pain, bladder incontinence, hematuria and history of kidney stones.   Musculoskeletal:  Positive for muscle ache. Negative for trauma, joint pain, joint swelling, abnormal ROM of joint and muscle cramps.   Skin:  Negative for color change, pale, rash and wound.   Allergic/Immunologic: Positive for environmental allergies. Negative for seasonal allergies, asthma and immunocompromised state.   Neurological:  Positive for headaches. Negative for dizziness, history of vertigo, light-headedness, passing out, facial drooping, speech difficulty, coordination disturbances, loss of balance, disorientation, altered mental status, loss of consciousness, numbness, tingling and seizures.   Hematologic/Lymphatic: Negative for swollen lymph nodes, easy bruising/bleeding and trouble clotting. Does not bruise/bleed easily.   Psychiatric/Behavioral:  Negative for altered mental status and disorientation.        Past Medical History:   Diagnosis Date    History of bronchitis        Objective:      Physical Exam   Constitutional: He is oriented to person, place, and time. He appears well-developed. He is cooperative.  Non-toxic appearance. He does not appear ill. No distress.   HENT:   Head: Normocephalic and  atraumatic.   Ears:   Right Ear: Hearing, external ear and ear canal normal. No no drainage, swelling or tenderness.   Left Ear: Hearing, external ear and ear canal normal. No no drainage, swelling or tenderness.   Nose: Nose normal. No rhinorrhea or purulent discharge. Right sinus exhibits no maxillary sinus tenderness and no frontal sinus tenderness. Left sinus exhibits no maxillary sinus tenderness and no frontal sinus tenderness.   Mouth/Throat: Uvula is midline, oropharynx is clear and moist and mucous membranes are normal. No oral lesions. No trismus in the jaw. No uvula swelling. No oropharyngeal exudate, posterior oropharyngeal edema or posterior oropharyngeal erythema. No tonsillar exudate.   Eyes: Conjunctivae, EOM and lids are normal. Pupils are equal, round, and reactive to light. No visual field deficit is present. Right eye exhibits no discharge. Left eye exhibits no discharge. Right conjunctiva is not injected. Right conjunctiva has no hemorrhage. Left conjunctiva is not injected. Left conjunctiva has no hemorrhage. Extraocular movement intact vision grossly intact gaze aligned appropriately   Neck: Neck supple. No neck rigidity present.   Cardiovascular: Normal rate, regular rhythm, normal heart sounds and normal pulses.   No murmur heard.  Pulmonary/Chest: Effort normal and breath sounds normal. No accessory muscle usage or stridor. No respiratory distress. He has no wheezes. He exhibits no tenderness.   Abdominal: Normal appearance. He exhibits no distension and no mass. Soft. There is no abdominal tenderness. There is no rebound and no guarding.   Musculoskeletal: Normal range of motion.         General: Normal range of motion.      Right lower leg: No edema.      Left lower leg: No edema.      Comments: Moves all extremities with normal tone, strength, and ROM.  Gait normal.   Lymphadenopathy:     He has no cervical adenopathy.   Neurological: no focal deficit. He is alert, oriented to person,  place, and time and at baseline. He has normal motor skills and normal sensation. He displays no weakness, facial symmetry, normal reflexes and no dysarthria. No cranial nerve deficit or sensory deficit. He exhibits normal muscle tone. He has a normal Finger-Nose-Finger Test. Coordination: Heel to shin test normal. He shows no pronator drift. He displays no seizure activity. Gait and coordination normal. Coordination normal. GCS eye subscore is 4. GCS verbal subscore is 5. GCS motor subscore is 6.   Skin: Skin is warm, dry, not diaphoretic and no rash. Capillary refill takes less than 2 seconds.   Psychiatric: His speech is normal and behavior is normal. Thought content normal.   Nursing note and vitals reviewed.        Results for orders placed or performed in visit on 01/10/23   SARS Coronavirus 2 Antigen, POCT Manual Read   Result Value Ref Range    SARS Coronavirus 2 Antigen Positive (A) Negative     Acceptable Yes        EKG 1/10/2023    Normal sinus rhythm with no significant ST changes.    Vent. Rate        92 bpm  MA interval            168 ms    QRS duration        84 ms  QT/QTc                 348/430 ms  P-R-T Axes           65   62  45    Assessment:       1. COVID-19 virus infection    2. Chest tightness    3. Cough with congestion of paranasal sinus    4. SOB (shortness of breath)    5. Generalized body aches          Note dictated with voice recognition software, please excuse any grammatical errors.    Nontoxic appearing. Vitals are stable. Rapid covid positive.  EKG with normal sinus rhythm and no acute ST elevation or depression.  All diagnostic testing personally reviewed and interpreted.   Patient has symptoms at this time which is consistent with above diagnosis.        Patient was recommended OTC treatments for their symptoms. Patient was also prescribed medications for their symptoms.   Patient has 1 complication risk (CALCULATED PER EPIC)  so was not referred for EUA antibody  infusion.  Discussed antiviral Covid-19 treatment.    We did discuss treatment options including Emergency FDA authorization use of Paxlovid.  We did discuss the warnings/precaustions/alternatives/side effects/benefits/risks/expected course/rebound of this treatment.  Patient would like to proceed.  This was sent to patient's pharmacy on file and was confirmed with antiviral . Patient is aware that all [facility] retail pharmacies have this medication available. Also reviewed his drug interactions with the [facility] .  For appropriate treatment of this antiviral treatment, patient understands that it is their responsibility that all of their current medication must be informed to our clinic staff and confirmed with our Epic system which includes prescribed medication as well as over-the-counter supplements.    Emphasized the importance of OTC symptomatic treatment for improvements in symptoms and prevent further worsening of conditioning.  Recommend oral antihistamine q.a.m., antihistamine nasal spray 1-2 times per day, Mucinex DM Q 12 hour, and Sudafed q.4-6 hours in addition to prescribed medication.    Patient will need to quarantine for total 5 days and on day 6 May come out quarantine with full masks for an additional 5 days ( for total 10 days from symptoms/covid positive test).    Patient was counseled, explained with the test results meaning, expected course, and answered all of questions. They can also receive results via my chart.  Printed and verbal COVID /treatment guidelines were given.   Recommend follow-up PCP in the next 5-7 days if new or worsening symptoms.     Patient was counseled, explained with the test results meaning, expected course, and answered all of questions. They can also receive results via my chart.  Printed and verbal treatment guidelines/recommendations were given.   Recommend follow-up PCP in the next 2-3 days if new or worsening symptoms.    Patient understands  that they received an Urgent Care treatment only and that they may be released before all your medical problems are known or treated. Strict ED versus clinic precautions given.  Patient verbalized understanding and agreed with plan of care.    Note dictated with voice recognition software, please excuse any grammatical errors.      Plan:         COVID-19 virus infection  -     nirmatrelvir-ritonavir 300 mg (150 mg x 2)-100 mg copackaged tablets (EUA); Take 3 tablets by mouth 2 (two) times daily for 5 days. Each dose contains 2 nirmatrelvir (pink tablets) and 1 ritonavir (white tablet). Take all 3 tablets together  Dispense: 30 tablet; Refill: 0  -     benzonatate (TESSALON) 200 MG capsule; Take 1 capsule (200 mg total) by mouth 3 (three) times daily as needed for Cough.  Dispense: 30 capsule; Refill: 0  -     albuterol (VENTOLIN HFA) 90 mcg/actuation inhaler; Inhale 2 puffs into the lungs every 6 (six) hours as needed for Wheezing or Shortness of Breath. Rescue  Dispense: 18 g; Refill: 0    Chest tightness  -     EKG 12-lead  -     albuterol (VENTOLIN HFA) 90 mcg/actuation inhaler; Inhale 2 puffs into the lungs every 6 (six) hours as needed for Wheezing or Shortness of Breath. Rescue  Dispense: 18 g; Refill: 0    Cough with congestion of paranasal sinus  -     benzonatate (TESSALON) 200 MG capsule; Take 1 capsule (200 mg total) by mouth 3 (three) times daily as needed for Cough.  Dispense: 30 capsule; Refill: 0  -     albuterol (VENTOLIN HFA) 90 mcg/actuation inhaler; Inhale 2 puffs into the lungs every 6 (six) hours as needed for Wheezing or Shortness of Breath. Rescue  Dispense: 18 g; Refill: 0    SOB (shortness of breath)  -     SARS Coronavirus 2 Antigen, POCT Manual Read  -     albuterol (VENTOLIN HFA) 90 mcg/actuation inhaler; Inhale 2 puffs into the lungs every 6 (six) hours as needed for Wheezing or Shortness of Breath. Rescue  Dispense: 18 g; Refill: 0    Generalized body aches              Additional MDM:      Heart Failure Score:   COPD = No    Patient Instructions     PLEASE READ YOUR DISCHARGE INSTRUCTIONS ENTIRELY AS IT CONTAINS IMPORTANT INFORMATION.    Patient had covid testing done today.    Discussed corona virus precautions and reviewed CDC FAC; printed a copy for patient.  I discussed to continue to monitor their symptoms. Discussed that if their symptoms persist or worsen to seek re-evaluation. Clinic vs. ER precautions were given.  Patient verbalized understanding and agreed with the entire plan of care.    If you tested positive and have symptoms, you must isolate for 5 days starting today OR day of the positive test. After 5 days (ON DAY #6), if your symptoms have improved and you have not had fever on day 5, you can return to the community on day #6- NO TESTING REQUIRED to return to community! If no fever without fever reducing meds for 24 hours, before coming out of quarantine. After your 5 days of isolation are completed, the CDC recommends strict mask use for the first 5 days (day #6-10) that you come out of isolation.    QUARANTINE START DATE**1/10/2023  MAY COME OUT OF QUARANTINE ON DAY#6 DATE**1/15/2023 BUT CONTINUE STRICT MASKS FOR ANOTHER 5 DAYS.       PER CDC GUIDELINES, YOU MAY NOT TRAVEL UNTIL DAY #11; WHICH IS **1/20/23    You should continue to wear a well-fitting mask around others at home and in public for 5 additional days (day 6 through day 10) after the end of your 5-day isolation period. If you are unable to wear a mask when around others, you should continue to isolate for a full 10 days. Avoid people who have weakened immune systems or are more likely to get very sick from COVID-19, and nursing homes and other high-risk settings, until after at least 10 days.      - Reviewed radiographs and all diagnostic testing with patient/family.    - Rest.  Drink plenty of fluids.    - Tylenol OR anti-inflammatory (NSAIDs, ibuprofen, aleve, motrin) as directed as needed for fever/pain.  For  Tylenol, do not exceed 3000 mg/ day. If no contraindication or allergies.  -OK to supplement with OTC DayQuil, NyQuil or TheraFlu every 6 hours as needed for cough and congestion.  Use caution of total amount of Tylenol/acetaminophen per day.    - continue albuterol inhaler as needed for shortness of breath/wheezing  - take Tessalon as needed for cough suppression.     -Below are suggestions for symptomatic relief:              -Salt water gargles to soothe throat pain.              -Chloroseptic spray also helps to numb throat pain. Drink hot tea with honey or lemon to soothe your throat.              -Nasal saline spray reduces inflammation and dryness.              -Warm face compresses to help with facial sinus pain/pressure.              -Vicks vapor rub at night.            **may also supplement with OTC nasal spray to help with inflammation and congestion.   Wean to off when you nose becomes to dry or bleed. Also use nasal saline twice a day to help with dryness.               -Flonase OTC or Nasacort OTC  once or twice a day for nasal/sinus congestion. DON'T USE IF YOU HAVE GLAUCOMA. CHECK WITH YOUR PHARMACIST/PHYSICIAN.              -Simple foods like chicken noodle soup.              -Mucinex DM (ANY COUGH EXPECTORANT-- guaifenesin) for cough or chest congestion with mucus and (ANY COUGH SUPPRESSANT- dextromethorphan) helps with coughing every 12 hours. Mucinex-DM if you have chest congestion or sputum (caution if history of high blood pressure or palpitations).              -Zyrtec/Claritin/xyzal during the day time  & Benadryl at night (only if severe runny nose) may help with allergies and runny nose. Add decongestant if you have nasal/sinus congestion/sinus pressure/ear fullness sensation. (see below)              -may take OTC meclizine as needed for dizziness or nausea.     Caution with use of Decongestant meds:  -If you DO NOT have Hypertension or any history of palpitations, it is ok to take over  the counter Sudafed or Mucinex D or Allegra-D or Claritin-D or Zyrtec-D.  -If you do take one of the above, it is ok to combine that with plain over the counter Mucinex or Allegra or Claritin or Zyrtec. If, for example, you are taking Zyrtec -D, you can combine that with Mucinex, but not Mucinex-D.  If you are taking Mucinex-D, you can combine that with plain Allegra or Claritin or Zyrtec.     -Do not combine pseudophed or phenylephrine with any other brand allergy-D for DECONGESTANT.   -Or vice versa, you can you take plain allergy medications (allegra/claritin/zyrtec with NO Decongestant) and ADD OTC pseudophed or phenelyphrine 3 times a day (or every 4-6 hours needed). Avoid taking decongestant late at night or with caffeine as it can keep you up or cause jittery feeling.     -If you DO have Hypertension , anxiety, or palpitations, it is safe to take Coricidin HBP for relief of cough, congestion, or sinus symptoms every 4-6 hours.      For your GI symptoms:  -Use gatorade/pedialyte or rehydration packets to help stay hydrated. Vitamin water and plain water do not contain rehydrating electrolytes.  -Increase clear liquids (water, gatorade, pedialyte, broths, jello, etc) Hold off on solids for 12-18 hours. Then advance to BRAT diet (banana, rice, applesauce, tea, toast/crackers), then advance further as tolerated. Avoid spicy or fatty foods.     -Please go to the ER if you experience worsening abdominal pain, blood in your vomit or stool, high fever, dizziness, fainting, swelling of your abdomen, inability to pass gas or stool, or inability to urinate.     -You must understand that you've received an Urgent Care treatment only and that you may be released before all your medical problems are known or treated. You, the patient, will arrange for follow up care as instructed. Please arrange follow up with your primary medical clinic within 2-5 days if your signs and symptoms have not resolved or worsen.     - Follow  up with your PCP or specialty clinic as directed.  You can call [phone] or [phone] to schedule an appointment with the appropriate provider.  Schedule CENTER is open Mon-Friday 8-5pm (excluded holidays).    - If your condition worsens or fails to improve we recommend that you receive another evaluation at the emergency room immediately or contact your primary medical clinic to discuss your concerns.            Prevention steps for patients with confirmed or suspected COVID-19  Stay home and stay away from family members and friends. The CDC says, you can leave home after these three things have happened: 1) You have had no fever for at least 24 hours (that is one full day of no fever without the use of medicine that reduces fevers) 2) AND other symptoms have improved (for example, when your cough or shortness of breath have improved) 3) AND at least 10 days have passed since your symptoms first appeared OR after 6-10 days passed from first positive test (with mask on till day #11).  Separate yourself from other people and animals in your home.  Call ahead before visiting your doctor.  Wear a facemask.  Cover your coughs and sneezes.  Wash your hands often with soap and water; hand  can be used, too.  Avoid sharing personal household items.  Wipe down surfaces used daily.  Monitor your symptoms. Seek prompt medical attention if your illness is worsening (e.g., difficulty breathing).   Before seeking care, call your healthcare provider.  If you have a medical emergency and need to call 911, notify the dispatch personnel that you have, or are being evaluated for COVID-19. If possible, put on a facemask before emergency medical services arrive.      Recommended precautions for household members, intimate partners, and caregivers in a home setting of a patient with symptomatic laboratory-confirmed COVID-19 or a patient under investigation.  Household members, intimate partners, and caregivers in the  home setting awaiting tests results have close contact with a person with symptomatic, laboratory-confirmed COVID-19 or a person under investigation. Close contacts should monitor their health; they should call their provider right away if they develop symptoms suggestive of COVID-19 (e.g., fever, cough, shortness of breath).    Close contacts should also follow these recommendations:  Make sure that you understand and can help the patient follow their provider's instructions for medication(s) and care. You should help the patient with basic needs in the home and provide support for getting groceries, prescriptions, and other personal needs.  Monitor the patient's symptoms. If the patient is getting sicker, call his or her healthcare provider and tell them that the patient has laboratory-confirmed COVID-19. If the patient has a medical emergency and you need to call 911, notify the dispatch personnel that the patient has, or is being evaluated for COVID-19.  Household members should stay in another room or be  from the patient. Household members should use a separate bedroom and bathroom, if available.  Prohibit visitors.  Household members should care for any pets in the home.  Make sure that shared spaces in the home have good air flow, such as by an air conditioner or an opened window, weather permitting.  Perform hand hygiene frequently. Wash your hands often with soap and water for at least 20 seconds or use an alcohol-based hand  (that contains > 60% alcohol) covering all surfaces of your hands and rubbing them together until they feel dry. Soap and water should be used preferentially.  Avoid touching your eyes, nose, and mouth.  The patient should wear a facemask. If the patient is not able to wear a facemask (for example, because it causes trouble breathing), caregivers should wear a mask when they are in the same room as the patient.  Wear a disposable facemask and gloves when you touch or  have contact with the patient's blood, stool, or body fluids, such as saliva, sputum, nasal mucus, vomit, urine.  Throw out disposable facemasks and gloves after using them. Do not reuse.  When removing personal protective equipment, first remove and dispose of gloves. Then, immediately clean your hands with soap and water or alcohol-based hand . Next, remove and dispose of facemask, and immediately clean your hands again with soap and water or alcohol-based hand .  You should not share dishes, drinking glasses, cups, eating utensils, towels, bedding, or other items with the patient. After the patient uses these items, you should wash them thoroughly (see below Wash laundry thoroughly).  Clean all high-touch surfaces, such as counters, tabletops, doorknobs, bathroom fixtures, toilets, phones, keyboards, tablets, and bedside tables, every day. Also, clean any surfaces that may have blood, stool, or body fluids on them.  Use a household cleaning spray or wipe, according to the label instructions. Labels contain instructions for safe and effective use of the cleaning product including precautions you should take when applying the product, such as wearing gloves and making sure you have good ventilation during use of the product.  Wash laundry thoroughly.  Immediately remove and wash clothes or bedding that have blood, stool, or body fluids on them.  Wear disposable gloves while handling soiled items and keep soiled items away from your body. Clean your hands (with soap and water or an alcohol-based hand ) immediately after removing your gloves.  Read and follow directions on labels of laundry or clothing items and detergent. In general, using a normal laundry detergent according to washing machine instructions and dry thoroughly using the warmest temperatures recommended on the clothing label.  Place all used disposable gloves, facemasks, and other contaminated items in a lined container  before disposing of them with other household waste. Clean your hands (with soap and water or an alcohol-based hand ) immediately after handling these items. Soap and water should be used preferentially if hands are visibly dirty.  Discuss any additional questions with your state or local health department or healthcare provider. Check available hours when contacting your local health department.    For more information see CDC link below.      https://www.cdc.gov/coronavirus/2019-ncov/hcp/guidance-prevent-spread.html#precautions        Sources:  [facility], [place] [facility]          Instructions for Home Care of Patients and Caretakers with Coronavirus Disease 2019  Limit visitors to the home.  Older persons and those that have chronic medical conditions such as diabetes, lung and heart disease are at increased risk for illness.   If possible, patients should use a separate bedroom while recovering. Caregivers and household members should avoid prolonged contact with the patient which means to stay 6 feet away and avoid contact with cough droplets.  When close contact is necessary, wash your hands before and immediately after contact.   Perform hand hygiene frequently. Wash your hands often with soap and water for at least 20 seconds or use an alcohol-based hand , covering all surfaces of your hands and rubbing them together until they feel dry.   Avoid touching your eyes, nose, and mouth with unwashed hands.  Avoid sharing household items with the patient. You should not share dishes, drinking glasses, cups, eating utensils, towels, bedding, or other items. After the patient uses these items, you should wash them thoroughly.  Wash laundry thoroughly.   Immediately remove and wash clothes or bedding that have blood, stool, or body fluids on them.  Clean all high-touch surfaces, such as counters, tabletops, doorknobs, bathroom fixtures, toilets, phones, keyboards, tablets,  and bedside tables, every day.   Use a household cleaning spray or wipe, according to the label instructions. Labels contain instructions for safe and effective use of the cleaning product including precautions you should take when applying the product, such as wearing gloves and making sure you have good ventilation during use of the product.    For more information see CDC link below.      https://www.cdc.gov/coronavirus/2019-ncov/hcp/guidance-prevent-spread.html#precautions               If your symptoms worsen or if you have any other concerns, please contact [facility] at [phone].

## 2023-01-10 NOTE — LETTER
[address] ? [place], [address] ? Phone [phone] ? Fax [phone]           Return to Work/School    Patient: Benoit Vasquez  YOB: 2000   Date: 01/10/2023      To Whom It May Concern:     Benoit Vasquez was in contact with/seen in my office on 01/10/2023. COVID-19 is present in our communities across the [facility]. Not all patients are eligible or appropriate to be tested. In this situation, your employee meets the following criteria:     Benoit Vasquez has met the criteria for COVID-19 testing and has a POSITIVE result. NO REPEAT COVID TESTING AFTER COMPLETING QUARANTINE IS RECOMMENDED OR REQUIRED!    They can return to work/school once they are asymptomatic for 24 hours without the use of fever reducing medications AND at least 5 days from the  first positive result. Quarantine start date 1/10/23 and come out of quarantine on day #6 on 1/15/2023 BUT CONTINUE MASKS FOR ANOTHER 5 DAYS until day #11. After 5 days, if your symptoms have improved and you have not had fever on day 5, you can return to the community on day 6-     23PER CDC GUIDELINES, YOU MAY NOT TRAVEL UNTIL DAY #11; WHICH IS 1/20/2023  STRICT MASKS precautions. Masks must be well fitted and closed (mask at all times in public) FOR ANOTHER 5 DAYS.   You should continue to wear a well-fitting mask around others at home and in public for 5 additional days (day 6 through day 10) after the end of your 5-day isolation period. If you are unable to wear a mask when around others, you should continue to isolate for a full 10 days. Avoid people who have weakened immune systems or are more likely to get very sick from COVID-19, and nursing homes and other high-risk settings, until after at least 10 days.      If you have any questions or concerns, or if I can be of further assistance, please do not hesitate to contact me.     Sincerely,    [facility]

## 2023-01-11 NOTE — PATIENT INSTRUCTIONS
PLEASE READ YOUR DISCHARGE INSTRUCTIONS ENTIRELY AS IT CONTAINS IMPORTANT INFORMATION.    Patient had covid testing done today.    Discussed corona virus precautions and reviewed CDC FAC; printed a copy for patient.  I discussed to continue to monitor their symptoms. Discussed that if their symptoms persist or worsen to seek re-evaluation. Clinic vs. ER precautions were given.  Patient verbalized understanding and agreed with the entire plan of care.    If you tested positive and have symptoms, you must isolate for 5 days starting today OR day of the positive test. After 5 days (ON DAY #6), if your symptoms have improved and you have not had fever on day 5, you can return to the community on day #6- NO TESTING REQUIRED to return to community! If no fever without fever reducing meds for 24 hours, before coming out of quarantine. After your 5 days of isolation are completed, the CDC recommends strict mask use for the first 5 days (day #6-10) that you come out of isolation.    QUARANTINE START DATE**1/10/2023  MAY COME OUT OF QUARANTINE ON DAY#6 DATE**1/15/2023 BUT CONTINUE STRICT MASKS FOR ANOTHER 5 DAYS.       PER CDC GUIDELINES, YOU MAY NOT TRAVEL UNTIL DAY #11; WHICH IS **1/20/23    You should continue to wear a well-fitting mask around others at home and in public for 5 additional days (day 6 through day 10) after the end of your 5-day isolation period. If you are unable to wear a mask when around others, you should continue to isolate for a full 10 days. Avoid people who have weakened immune systems or are more likely to get very sick from COVID-19, and nursing homes and other high-risk settings, until after at least 10 days.      - Reviewed radiographs and all diagnostic testing with patient/family.    - Rest.  Drink plenty of fluids.    - Tylenol OR anti-inflammatory (NSAIDs, ibuprofen, aleve, motrin) as directed as needed for fever/pain.  For Tylenol, do not exceed 3000 mg/ day. If no contraindication or  allergies.  -OK to supplement with OTC DayQuil, NyQuil or TheraFlu every 6 hours as needed for cough and congestion.  Use caution of total amount of Tylenol/acetaminophen per day.    - continue albuterol inhaler as needed for shortness of breath/wheezing  - take Tessalon as needed for cough suppression.     -Below are suggestions for symptomatic relief:              -Salt water gargles to soothe throat pain.              -Chloroseptic spray also helps to numb throat pain. Drink hot tea with honey or lemon to soothe your throat.              -Nasal saline spray reduces inflammation and dryness.              -Warm face compresses to help with facial sinus pain/pressure.              -Vicks vapor rub at night.            **may also supplement with OTC nasal spray to help with inflammation and congestion.   Wean to off when you nose becomes to dry or bleed. Also use nasal saline twice a day to help with dryness.               -Flonase OTC or Nasacort OTC  once or twice a day for nasal/sinus congestion. DON'T USE IF YOU HAVE GLAUCOMA. CHECK WITH YOUR PHARMACIST/PHYSICIAN.              -Simple foods like chicken noodle soup.              -Mucinex DM (ANY COUGH EXPECTORANT-- guaifenesin) for cough or chest congestion with mucus and (ANY COUGH SUPPRESSANT- dextromethorphan) helps with coughing every 12 hours. Mucinex-DM if you have chest congestion or sputum (caution if history of high blood pressure or palpitations).              -Zyrtec/Claritin/xyzal during the day time  & Benadryl at night (only if severe runny nose) may help with allergies and runny nose. Add decongestant if you have nasal/sinus congestion/sinus pressure/ear fullness sensation. (see below)              -may take OTC meclizine as needed for dizziness or nausea.     Caution with use of Decongestant meds:  -If you DO NOT have Hypertension or any history of palpitations, it is ok to take over the counter Sudafed or Mucinex D or Allegra-D or Claritin-D or  Zyrtec-D.  -If you do take one of the above, it is ok to combine that with plain over the counter Mucinex or Allegra or Claritin or Zyrtec. If, for example, you are taking Zyrtec -D, you can combine that with Mucinex, but not Mucinex-D.  If you are taking Mucinex-D, you can combine that with plain Allegra or Claritin or Zyrtec.     -Do not combine pseudophed or phenylephrine with any other brand allergy-D for DECONGESTANT.   -Or vice versa, you can you take plain allergy medications (allegra/claritin/zyrtec with NO Decongestant) and ADD OTC pseudophed or phenelyphrine 3 times a day (or every 4-6 hours needed). Avoid taking decongestant late at night or with caffeine as it can keep you up or cause jittery feeling.     -If you DO have Hypertension , anxiety, or palpitations, it is safe to take Coricidin HBP for relief of cough, congestion, or sinus symptoms every 4-6 hours.      For your GI symptoms:  -Use gatorade/pedialyte or rehydration packets to help stay hydrated. Vitamin water and plain water do not contain rehydrating electrolytes.  -Increase clear liquids (water, gatorade, pedialyte, broths, jello, etc) Hold off on solids for 12-18 hours. Then advance to BRAT diet (banana, rice, applesauce, tea, toast/crackers), then advance further as tolerated. Avoid spicy or fatty foods.     -Please go to the ER if you experience worsening abdominal pain, blood in your vomit or stool, high fever, dizziness, fainting, swelling of your abdomen, inability to pass gas or stool, or inability to urinate.     -You must understand that you've received an Urgent Care treatment only and that you may be released before all your medical problems are known or treated. You, the patient, will arrange for follow up care as instructed. Please arrange follow up with your primary medical clinic within 2-5 days if your signs and symptoms have not resolved or worsen.     - Follow up with your PCP or specialty clinic as directed.  You can call  [phone] or [phone] to schedule an appointment with the appropriate provider.  Schedule CENTER is open Mon-Friday 8-5pm (excluded holidays).    - If your condition worsens or fails to improve we recommend that you receive another evaluation at the emergency room immediately or contact your primary medical clinic to discuss your concerns.            Prevention steps for patients with confirmed or suspected COVID-19  Stay home and stay away from family members and friends. The CDC says, you can leave home after these three things have happened: 1) You have had no fever for at least 24 hours (that is one full day of no fever without the use of medicine that reduces fevers) 2) AND other symptoms have improved (for example, when your cough or shortness of breath have improved) 3) AND at least 10 days have passed since your symptoms first appeared OR after 6-10 days passed from first positive test (with mask on till day #11).  Separate yourself from other people and animals in your home.  Call ahead before visiting your doctor.  Wear a facemask.  Cover your coughs and sneezes.  Wash your hands often with soap and water; hand  can be used, too.  Avoid sharing personal household items.  Wipe down surfaces used daily.  Monitor your symptoms. Seek prompt medical attention if your illness is worsening (e.g., difficulty breathing).   Before seeking care, call your healthcare provider.  If you have a medical emergency and need to call 911, notify the dispatch personnel that you have, or are being evaluated for COVID-19. If possible, put on a facemask before emergency medical services arrive.      Recommended precautions for household members, intimate partners, and caregivers in a home setting of a patient with symptomatic laboratory-confirmed COVID-19 or a patient under investigation.  Household members, intimate partners, and caregivers in the home setting awaiting tests results have close contact with a  person with symptomatic, laboratory-confirmed COVID-19 or a person under investigation. Close contacts should monitor their health; they should call their provider right away if they develop symptoms suggestive of COVID-19 (e.g., fever, cough, shortness of breath).    Close contacts should also follow these recommendations:  Make sure that you understand and can help the patient follow their provider's instructions for medication(s) and care. You should help the patient with basic needs in the home and provide support for getting groceries, prescriptions, and other personal needs.  Monitor the patient's symptoms. If the patient is getting sicker, call his or her healthcare provider and tell them that the patient has laboratory-confirmed COVID-19. If the patient has a medical emergency and you need to call 911, notify the dispatch personnel that the patient has, or is being evaluated for COVID-19.  Household members should stay in another room or be  from the patient. Household members should use a separate bedroom and bathroom, if available.  Prohibit visitors.  Household members should care for any pets in the home.  Make sure that shared spaces in the home have good air flow, such as by an air conditioner or an opened window, weather permitting.  Perform hand hygiene frequently. Wash your hands often with soap and water for at least 20 seconds or use an alcohol-based hand  (that contains > 60% alcohol) covering all surfaces of your hands and rubbing them together until they feel dry. Soap and water should be used preferentially.  Avoid touching your eyes, nose, and mouth.  The patient should wear a facemask. If the patient is not able to wear a facemask (for example, because it causes trouble breathing), caregivers should wear a mask when they are in the same room as the patient.  Wear a disposable facemask and gloves when you touch or have contact with the patient's blood, stool, or body fluids,  such as saliva, sputum, nasal mucus, vomit, urine.  Throw out disposable facemasks and gloves after using them. Do not reuse.  When removing personal protective equipment, first remove and dispose of gloves. Then, immediately clean your hands with soap and water or alcohol-based hand . Next, remove and dispose of facemask, and immediately clean your hands again with soap and water or alcohol-based hand .  You should not share dishes, drinking glasses, cups, eating utensils, towels, bedding, or other items with the patient. After the patient uses these items, you should wash them thoroughly (see below Wash laundry thoroughly).  Clean all high-touch surfaces, such as counters, tabletops, doorknobs, bathroom fixtures, toilets, phones, keyboards, tablets, and bedside tables, every day. Also, clean any surfaces that may have blood, stool, or body fluids on them.  Use a household cleaning spray or wipe, according to the label instructions. Labels contain instructions for safe and effective use of the cleaning product including precautions you should take when applying the product, such as wearing gloves and making sure you have good ventilation during use of the product.  Wash laundry thoroughly.  Immediately remove and wash clothes or bedding that have blood, stool, or body fluids on them.  Wear disposable gloves while handling soiled items and keep soiled items away from your body. Clean your hands (with soap and water or an alcohol-based hand ) immediately after removing your gloves.  Read and follow directions on labels of laundry or clothing items and detergent. In general, using a normal laundry detergent according to washing machine instructions and dry thoroughly using the warmest temperatures recommended on the clothing label.  Place all used disposable gloves, facemasks, and other contaminated items in a lined container before disposing of them with other household waste. Clean  your hands (with soap and water or an alcohol-based hand ) immediately after handling these items. Soap and water should be used preferentially if hands are visibly dirty.  Discuss any additional questions with your state or local health department or healthcare provider. Check available hours when contacting your local health department.    For more information see CDC link below.      https://www.cdc.gov/coronavirus/2019-ncov/hcp/guidance-prevent-spread.html#precautions        Sources:  [facility], [place] [facility]          Instructions for Home Care of Patients and Caretakers with Coronavirus Disease 2019  Limit visitors to the home.  Older persons and those that have chronic medical conditions such as diabetes, lung and heart disease are at increased risk for illness.   If possible, patients should use a separate bedroom while recovering. Caregivers and household members should avoid prolonged contact with the patient which means to stay 6 feet away and avoid contact with cough droplets.  When close contact is necessary, wash your hands before and immediately after contact.   Perform hand hygiene frequently. Wash your hands often with soap and water for at least 20 seconds or use an alcohol-based hand , covering all surfaces of your hands and rubbing them together until they feel dry.   Avoid touching your eyes, nose, and mouth with unwashed hands.  Avoid sharing household items with the patient. You should not share dishes, drinking glasses, cups, eating utensils, towels, bedding, or other items. After the patient uses these items, you should wash them thoroughly.  Wash laundry thoroughly.   Immediately remove and wash clothes or bedding that have blood, stool, or body fluids on them.  Clean all high-touch surfaces, such as counters, tabletops, doorknobs, bathroom fixtures, toilets, phones, keyboards, tablets, and bedside tables, every day.   Use a household cleaning  spray or wipe, according to the label instructions. Labels contain instructions for safe and effective use of the cleaning product including precautions you should take when applying the product, such as wearing gloves and making sure you have good ventilation during use of the product.    For more information see CDC link below.      https://www.cdc.gov/coronavirus/2019-ncov/hcp/guidance-prevent-spread.html#precautions               If your symptoms worsen or if you have any other concerns, please contact [facility] at [phone].

## 2023-03-14 ENCOUNTER — OFFICE VISIT (OUTPATIENT)
Dept: URGENT CARE | Facility: CLINIC | Age: 23
End: 2023-03-14
Payer: COMMERCIAL

## 2023-03-14 VITALS
RESPIRATION RATE: 18 BRPM | BODY MASS INDEX: 27.92 KG/M2 | TEMPERATURE: 98 F | HEART RATE: 84 BPM | WEIGHT: 183.63 LBS | SYSTOLIC BLOOD PRESSURE: 115 MMHG | OXYGEN SATURATION: 98 % | DIASTOLIC BLOOD PRESSURE: 64 MMHG

## 2023-03-14 DIAGNOSIS — J20.8 VIRAL BRONCHITIS: Primary | ICD-10-CM

## 2023-03-14 LAB
CTP QC/QA: YES
SARS-COV-2 AG RESP QL IA.RAPID: NEGATIVE

## 2023-03-14 PROCEDURE — 99213 OFFICE O/P EST LOW 20 MIN: CPT | Mod: 25,S$GLB,, | Performed by: FAMILY MEDICINE

## 2023-03-14 PROCEDURE — 99213 PR OFFICE/OUTPT VISIT, EST, LEVL III, 20-29 MIN: ICD-10-PCS | Mod: 25,S$GLB,, | Performed by: FAMILY MEDICINE

## 2023-03-14 PROCEDURE — 94640 PR INHAL RX, AIRWAY OBST/DX SPUTUM INDUCT: ICD-10-PCS | Mod: S$GLB,,, | Performed by: FAMILY MEDICINE

## 2023-03-14 PROCEDURE — 94640 AIRWAY INHALATION TREATMENT: CPT | Mod: S$GLB,,, | Performed by: FAMILY MEDICINE

## 2023-03-14 PROCEDURE — 96372 THER/PROPH/DIAG INJ SC/IM: CPT | Mod: 59,S$GLB,, | Performed by: FAMILY MEDICINE

## 2023-03-14 PROCEDURE — 87811 SARS CORONAVIRUS 2 ANTIGEN POCT, MANUAL READ: ICD-10-PCS | Mod: QW,S$GLB,, | Performed by: FAMILY MEDICINE

## 2023-03-14 PROCEDURE — 71046 XR CHEST PA AND LATERAL: ICD-10-PCS | Mod: FY,S$GLB,, | Performed by: RADIOLOGY

## 2023-03-14 PROCEDURE — 71046 X-RAY EXAM CHEST 2 VIEWS: CPT | Mod: FY,S$GLB,, | Performed by: RADIOLOGY

## 2023-03-14 PROCEDURE — 96372 PR INJECTION,THERAP/PROPH/DIAG2ST, IM OR SUBCUT: ICD-10-PCS | Mod: 59,S$GLB,, | Performed by: FAMILY MEDICINE

## 2023-03-14 PROCEDURE — 87811 SARS-COV-2 COVID19 W/OPTIC: CPT | Mod: QW,S$GLB,, | Performed by: FAMILY MEDICINE

## 2023-03-14 RX ORDER — IPRATROPIUM BROMIDE 0.5 MG/2.5ML
0.5 SOLUTION RESPIRATORY (INHALATION)
Status: COMPLETED | OUTPATIENT
Start: 2023-03-14 | End: 2023-03-14

## 2023-03-14 RX ORDER — DEXAMETHASONE SODIUM PHOSPHATE 100 MG/10ML
10 INJECTION INTRAMUSCULAR; INTRAVENOUS
Status: COMPLETED | OUTPATIENT
Start: 2023-03-14 | End: 2023-03-14

## 2023-03-14 RX ORDER — BENZONATATE 100 MG/1
100 CAPSULE ORAL EVERY 6 HOURS PRN
Qty: 30 CAPSULE | Refills: 1 | Status: SHIPPED | OUTPATIENT
Start: 2023-03-14 | End: 2024-03-13

## 2023-03-14 RX ORDER — PREDNISONE 20 MG/1
40 TABLET ORAL DAILY
Qty: 10 TABLET | Refills: 0 | Status: SHIPPED | OUTPATIENT
Start: 2023-03-14 | End: 2023-03-19

## 2023-03-14 RX ORDER — ALBUTEROL SULFATE 0.83 MG/ML
2.5 SOLUTION RESPIRATORY (INHALATION)
Status: COMPLETED | OUTPATIENT
Start: 2023-03-14 | End: 2023-03-14

## 2023-03-14 RX ORDER — ALBUTEROL SULFATE 90 UG/1
2 AEROSOL, METERED RESPIRATORY (INHALATION) EVERY 6 HOURS PRN
Qty: 18 G | Refills: 0 | Status: SHIPPED | OUTPATIENT
Start: 2023-03-14 | End: 2024-03-13

## 2023-03-14 RX ADMIN — IPRATROPIUM BROMIDE 0.5 MG: 0.5 SOLUTION RESPIRATORY (INHALATION) at 01:03

## 2023-03-14 RX ADMIN — DEXAMETHASONE SODIUM PHOSPHATE 10 MG: 100 INJECTION INTRAMUSCULAR; INTRAVENOUS at 02:03

## 2023-03-14 RX ADMIN — ALBUTEROL SULFATE 2.5 MG: 0.83 SOLUTION RESPIRATORY (INHALATION) at 01:03

## 2023-03-14 NOTE — PROGRESS NOTES
Subjective:       Patient ID: Benoit Vasquez is a 23 y.o. male.    Vitals:  weight is 83.3 kg (183 lb 10.3 oz). His oral temperature is 98.2 °F (36.8 °C). His blood pressure is 115/64 and his pulse is 84. His respiration is 18 and oxygen saturation is 98%.     Chief Complaint: Breathing Problem    Breathing Problem  He complains of chest tightness, cough, difficulty breathing and wheezing. The current episode started today. The problem occurs constantly. The problem has been gradually worsening. The cough is non-productive. Associated symptoms include chest pain, malaise/fatigue, postnasal drip and trouble swallowing. Pertinent negatives include no appetite change, fever or heartburn. His symptoms are aggravated by nothing. His symptoms are alleviated by nothing. His past medical history is significant for bronchitis.     Constitution: Negative for appetite change and fever.   HENT:  Positive for postnasal drip and trouble swallowing.    Cardiovascular:  Positive for chest pain.   Respiratory:  Positive for cough and wheezing.    Gastrointestinal:  Negative for heartburn.     Objective:      Physical Exam   Constitutional: He does not appear ill. No distress. normal  HENT:   Head: Normocephalic and atraumatic.   Nose: Congestion present.   Mouth/Throat: Mucous membranes are moist. Posterior oropharyngeal erythema present.   Eyes: Pupils are equal, round, and reactive to light. Extraocular movement intact   Neck: Neck supple.   Cardiovascular: Normal rate, regular rhythm, normal heart sounds and normal pulses.   Pulmonary/Chest: Effort normal. He has wheezes.   Abdominal: Normal appearance.   Neurological: He is alert.   Nursing note and vitals reviewed.      Results for orders placed or performed in visit on 03/14/23   SARS Coronavirus 2 Antigen, POCT Manual Read   Result Value Ref Range    SARS Coronavirus 2 Antigen Negative Negative     Acceptable Yes       Assessment:       1. Viral bronchitis           Plan:         Viral bronchitis  -     SARS Coronavirus 2 Antigen, POCT Manual Read  -     X-Ray Chest PA And Lateral  -     albuterol nebulizer solution 2.5 mg  -     ipratropium 0.02 % nebulizer solution 0.5 mg  -     dexAMETHasone injection 10 mg  -     albuterol (PROAIR HFA) 90 mcg/actuation inhaler; Inhale 2 puffs into the lungs every 6 (six) hours as needed for Wheezing. Rescue  Dispense: 18 g; Refill: 0  -     predniSONE (DELTASONE) 20 MG tablet; Take 2 tablets (40 mg total) by mouth once daily. for 5 days  Dispense: 10 tablet; Refill: 0  -     benzonatate (TESSALON PERLES) 100 MG capsule; Take 1 capsule (100 mg total) by mouth every 6 (six) hours as needed for Cough.  Dispense: 30 capsule; Refill: 1    RTC prn worsening symptoms

## 2024-01-17 ENCOUNTER — OFFICE VISIT (OUTPATIENT)
Dept: URGENT CARE | Facility: CLINIC | Age: 24
End: 2024-01-17
Payer: COMMERCIAL

## 2024-01-17 VITALS
TEMPERATURE: 100 F | OXYGEN SATURATION: 95 % | HEART RATE: 78 BPM | SYSTOLIC BLOOD PRESSURE: 114 MMHG | BODY MASS INDEX: 25.09 KG/M2 | WEIGHT: 165 LBS | RESPIRATION RATE: 16 BRPM | DIASTOLIC BLOOD PRESSURE: 68 MMHG

## 2024-01-17 DIAGNOSIS — J10.1 INFLUENZA A: Primary | ICD-10-CM

## 2024-01-17 LAB
CTP QC/QA: YES
CTP QC/QA: YES
POC MOLECULAR INFLUENZA A AGN: POSITIVE
POC MOLECULAR INFLUENZA B AGN: NEGATIVE
SARS-COV-2 AG RESP QL IA.RAPID: NEGATIVE

## 2024-01-17 PROCEDURE — 87811 SARS-COV-2 COVID19 W/OPTIC: CPT | Mod: QW,S$GLB,, | Performed by: NURSE PRACTITIONER

## 2024-01-17 PROCEDURE — 99213 OFFICE O/P EST LOW 20 MIN: CPT | Mod: S$GLB,,, | Performed by: NURSE PRACTITIONER

## 2024-01-17 PROCEDURE — 87502 INFLUENZA DNA AMP PROBE: CPT | Mod: QW,S$GLB,, | Performed by: NURSE PRACTITIONER

## 2024-01-17 RX ORDER — OSELTAMIVIR PHOSPHATE 75 MG/1
75 CAPSULE ORAL 2 TIMES DAILY
Qty: 10 CAPSULE | Refills: 0 | Status: SHIPPED | OUTPATIENT
Start: 2024-01-17 | End: 2024-01-22

## 2024-01-17 NOTE — PROGRESS NOTES
Subjective:      Patient ID: Benoit Vasquez is a 23 y.o. male.    Vitals:  weight is 74.8 kg (165 lb). His oral temperature is 100 °F (37.8 °C). His blood pressure is 114/68 and his pulse is 78. His respiration is 16 and oxygen saturation is 95%.     Chief Complaint: Cough    This is a 23 y.o. male who presents today with a chief complaint of productive cough, nasal congestion, body aches, chills, sore throat and headache (frontal) since yesterday. Denies ear px, ear congestion, nausea, vomiting, diarrhea, abd px, and fever.    Exposed to Flu    Home tx: thera flu    PMH: none    Cough  This is a new problem. The current episode started yesterday. The problem has been gradually worsening. The problem occurs every few hours. The cough is Productive of sputum. Associated symptoms include chills, headaches, myalgias, nasal congestion, rhinorrhea and a sore throat. Pertinent negatives include no chest pain, ear congestion, ear pain or fever. His past medical history is significant for bronchitis, environmental allergies and pneumonia. There is no history of asthma.       Constitution: Positive for chills. Negative for fever.   HENT:  Positive for sore throat. Negative for ear pain.    Cardiovascular:  Negative for chest pain.   Respiratory:  Positive for cough.    Musculoskeletal:  Positive for muscle ache.   Allergic/Immunologic: Positive for environmental allergies.   Neurological:  Positive for headaches.      Objective:     Physical Exam   Constitutional: He is oriented to person, place, and time. He appears well-developed. He is cooperative.  Non-toxic appearance. He does not appear ill. No distress.   HENT:   Head: Normocephalic.   Ears:   Right Ear: Hearing, tympanic membrane, external ear and ear canal normal.   Left Ear: Hearing, tympanic membrane, external ear and ear canal normal.   Nose: Congestion present. No mucosal edema, rhinorrhea or nasal deformity. No epistaxis. Right sinus exhibits no maxillary sinus  tenderness and no frontal sinus tenderness. Left sinus exhibits no maxillary sinus tenderness and no frontal sinus tenderness.   Mouth/Throat: Uvula is midline and mucous membranes are normal. Mucous membranes are moist. No trismus in the jaw. Normal dentition. No uvula swelling. Posterior oropharyngeal erythema present. No oropharyngeal exudate or posterior oropharyngeal edema. Oropharynx is clear.   Eyes: Conjunctivae and lids are normal. No scleral icterus.   Neck: Trachea normal and phonation normal. Neck supple. No edema present. No erythema present. No neck rigidity present.   Cardiovascular: Normal rate and regular rhythm.   Pulmonary/Chest: Effort normal and breath sounds normal. No respiratory distress. He has no decreased breath sounds. He has no wheezes. He has no rhonchi.   Abdominal: Normal appearance.   Musculoskeletal: Normal range of motion.         General: No deformity. Normal range of motion.   Neurological: He is alert and oriented to person, place, and time. He exhibits normal muscle tone. Coordination normal.   Skin: Skin is warm, dry, intact, not diaphoretic and not pale.   Psychiatric: His speech is normal and behavior is normal. Judgment and thought content normal.   Nursing note and vitals reviewed.    Results for orders placed or performed in visit on 01/17/24   POCT Influenza A/B MOLECULAR   Result Value Ref Range    POC Molecular Influenza A Ag Positive (A) Negative, Not Reported    POC Molecular Influenza B Ag Negative Negative, Not Reported     Acceptable Yes    SARS Coronavirus 2 Antigen, POCT Manual Read   Result Value Ref Range    SARS Coronavirus 2 Antigen Negative Negative     Acceptable Yes       Assessment:     1. Influenza A        Plan:       Influenza A  -     POCT Influenza A/B MOLECULAR  -     SARS Coronavirus 2 Antigen, POCT Manual Read  -     oseltamivir (TAMIFLU) 75 MG capsule; Take 1 capsule (75 mg total) by mouth 2 (two) times daily. for 5  days  Dispense: 10 capsule; Refill: 0

## 2024-01-17 NOTE — LETTER
January 17, 2024      [facility]  [address]  [facility] [address]  Phone: [phone]  Fax: [phone]       Patient: Benoit Vasquez   YOB: 2000  Date of Visit: 01/17/2024    To Whom It May Concern:    Krysten Vasquez  was at [facility] on 01/17/2024. The patient may return to work/school on 1/22/2024 with no restrictions. If you have any questions or concerns, or if I can be of further assistance, please do not hesitate to contact me.    Sincerely,    Shonda Justice, JACOB-BC

## 2024-02-01 ENCOUNTER — OFFICE VISIT (OUTPATIENT)
Dept: URGENT CARE | Facility: CLINIC | Age: 24
End: 2024-02-01
Payer: COMMERCIAL

## 2024-02-01 VITALS
TEMPERATURE: 98 F | WEIGHT: 165 LBS | HEART RATE: 77 BPM | DIASTOLIC BLOOD PRESSURE: 73 MMHG | SYSTOLIC BLOOD PRESSURE: 107 MMHG | RESPIRATION RATE: 18 BRPM | BODY MASS INDEX: 25.01 KG/M2 | OXYGEN SATURATION: 100 % | HEIGHT: 68 IN

## 2024-02-01 DIAGNOSIS — B96.89 BACTERIAL SINUSITIS: Primary | ICD-10-CM

## 2024-02-01 DIAGNOSIS — J32.9 BACTERIAL SINUSITIS: Primary | ICD-10-CM

## 2024-02-01 LAB
CTP QC/QA: YES
CTP QC/QA: YES
MOLECULAR STREP A: NEGATIVE
SARS-COV-2 AG RESP QL IA.RAPID: NEGATIVE

## 2024-02-01 PROCEDURE — 99213 OFFICE O/P EST LOW 20 MIN: CPT | Mod: S$GLB,,, | Performed by: NURSE PRACTITIONER

## 2024-02-01 PROCEDURE — 87651 STREP A DNA AMP PROBE: CPT | Mod: QW,S$GLB,, | Performed by: NURSE PRACTITIONER

## 2024-02-01 PROCEDURE — 87811 SARS-COV-2 COVID19 W/OPTIC: CPT | Mod: QW,S$GLB,, | Performed by: NURSE PRACTITIONER

## 2024-02-01 RX ORDER — BENZONATATE 200 MG/1
200 CAPSULE ORAL EVERY 8 HOURS PRN
Qty: 30 CAPSULE | Refills: 0 | Status: SHIPPED | OUTPATIENT
Start: 2024-02-01

## 2024-02-01 RX ORDER — PREDNISONE 50 MG/1
50 TABLET ORAL DAILY
Qty: 5 TABLET | Refills: 0 | Status: SHIPPED | OUTPATIENT
Start: 2024-02-01 | End: 2024-02-06

## 2024-02-01 RX ORDER — AMOXICILLIN AND CLAVULANATE POTASSIUM 875; 125 MG/1; MG/1
1 TABLET, FILM COATED ORAL EVERY 12 HOURS
Qty: 14 TABLET | Refills: 0 | Status: SHIPPED | OUTPATIENT
Start: 2024-02-01 | End: 2024-02-08

## 2024-02-01 NOTE — PROGRESS NOTES
"Subjective:      Patient ID: Benoit Vasquez is a 23 y.o. male.    Vitals:  height is 5' 8" (1.727 m) and weight is 74.8 kg (165 lb). His oral temperature is 98.2 °F (36.8 °C). His blood pressure is 107/73 and his pulse is 77. His respiration is 18 and oxygen saturation is 100%.     Chief Complaint: URI    This is a 23 y.o. male who presents today with a chief complaint of  body aches, cough, nasal congestion, sinus pressure, headache, sore throat, and chills/sweats. Pt states he was diagnosed with influenza on 1/17 and completed tamilfu. He states he never really recovered from illness, and in the last few days, symptoms are worsening.       URI   Associated symptoms include congestion, coughing, headaches, sinus pain, sneezing and a sore throat. Pertinent negatives include no abdominal pain, chest pain, diarrhea, dysuria, ear pain, joint pain, joint swelling, nausea, neck pain, plugged ear sensation, rash, rhinorrhea, swollen glands, vomiting or wheezing.       Constitution: Positive for fatigue and fever.   HENT:  Positive for congestion, sinus pain and sore throat. Negative for ear pain.    Neck: Negative for neck pain.   Cardiovascular:  Negative for chest pain.   Respiratory:  Positive for cough. Negative for wheezing.    Gastrointestinal:  Negative for abdominal pain, nausea, vomiting and diarrhea.   Genitourinary:  Negative for dysuria.   Skin:  Negative for rash.   Allergic/Immunologic: Positive for sneezing.   Neurological:  Positive for headaches.      Objective:     Physical Exam   Constitutional: He is oriented to person, place, and time. He appears well-developed. He is cooperative.  Non-toxic appearance. He does not appear ill. No distress.   HENT:   Head: Normocephalic.   Ears:   Right Ear: Hearing, tympanic membrane, external ear and ear canal normal.   Left Ear: Hearing, tympanic membrane, external ear and ear canal normal.   Nose: Congestion present. No mucosal edema, rhinorrhea or nasal deformity. No " epistaxis. Right sinus exhibits no maxillary sinus tenderness and no frontal sinus tenderness. Left sinus exhibits no maxillary sinus tenderness and no frontal sinus tenderness.   Mouth/Throat: Uvula is midline and mucous membranes are normal. Mucous membranes are moist. No trismus in the jaw. Normal dentition. No uvula swelling. Posterior oropharyngeal erythema present. No oropharyngeal exudate or posterior oropharyngeal edema. Oropharynx is clear.   Eyes: Conjunctivae and lids are normal. No scleral icterus.   Neck: Trachea normal and phonation normal. Neck supple. No edema present. No erythema present. No neck rigidity present.   Cardiovascular: Normal rate and regular rhythm.   Pulmonary/Chest: Effort normal. No respiratory distress. He has no decreased breath sounds. He has no wheezes. He has rhonchi.   Abdominal: Normal appearance.   Musculoskeletal: Normal range of motion.         General: No deformity. Normal range of motion.   Neurological: He is alert and oriented to person, place, and time. He exhibits normal muscle tone. Coordination normal.   Skin: Skin is warm, dry, intact, not diaphoretic and not pale.   Psychiatric: His speech is normal and behavior is normal. Judgment and thought content normal.   Nursing note and vitals reviewed.    Results for orders placed or performed in visit on 02/01/24   SARS Coronavirus 2 Antigen, POCT Manual Read   Result Value Ref Range    SARS Coronavirus 2 Antigen Negative Negative     Acceptable Yes    POCT Strep A, Molecular   Result Value Ref Range    Molecular Strep A, POC Negative Negative     Acceptable Yes       Assessment:     1. Bacterial sinusitis        Plan:       Bacterial sinusitis  -     SARS Coronavirus 2 Antigen, POCT Manual Read  -     POCT Strep A, Molecular  -     amoxicillin-clavulanate 875-125mg (AUGMENTIN) 875-125 mg per tablet; Take 1 tablet by mouth every 12 (twelve) hours. for 7 days  Dispense: 14 tablet; Refill:  0  -     predniSONE (DELTASONE) 50 MG Tab; Take 1 tablet (50 mg total) by mouth once daily. for 5 days  Dispense: 5 tablet; Refill: 0  -     benzonatate (TESSALON) 200 MG capsule; Take 1 capsule (200 mg total) by mouth every 8 (eight) hours as needed for Cough.  Dispense: 30 capsule; Refill: 0